# Patient Record
Sex: FEMALE | Race: WHITE | Employment: OTHER | URBAN - METROPOLITAN AREA
[De-identification: names, ages, dates, MRNs, and addresses within clinical notes are randomized per-mention and may not be internally consistent; named-entity substitution may affect disease eponyms.]

---

## 2017-11-30 ENCOUNTER — HOSPITAL ENCOUNTER (OUTPATIENT)
Dept: MAMMOGRAPHY | Age: 82
Discharge: HOME | End: 2017-11-30
Payer: MEDICARE

## 2017-11-30 DIAGNOSIS — Z12.39 SCREENING BREAST EXAMINATION: ICD-10-CM

## 2017-11-30 PROCEDURE — 77067 SCR MAMMO BI INCL CAD: CPT

## 2018-12-12 ENCOUNTER — HOSPITAL ENCOUNTER (OUTPATIENT)
Dept: MAMMOGRAPHY | Age: 83
Discharge: HOME | End: 2018-12-12
Attending: OBSTETRICS & GYNECOLOGY
Payer: MEDICARE

## 2018-12-12 DIAGNOSIS — N63.0 BREAST LUMP: ICD-10-CM

## 2018-12-12 DIAGNOSIS — N63.14 BREAST LUMP ON RIGHT SIDE AT 5 O'CLOCK POSITION: ICD-10-CM

## 2018-12-12 PROCEDURE — 77066 DX MAMMO INCL CAD BI: CPT

## 2018-12-12 PROCEDURE — 76642 ULTRASOUND BREAST LIMITED: CPT

## 2018-12-17 ENCOUNTER — HOSPITAL ENCOUNTER (OUTPATIENT)
Dept: MAMMOGRAPHY | Age: 83
Discharge: HOME | End: 2018-12-17
Attending: OBSTETRICS & GYNECOLOGY
Payer: MEDICARE

## 2018-12-17 VITALS — HEART RATE: 60 BPM | SYSTOLIC BLOOD PRESSURE: 163 MMHG | DIASTOLIC BLOOD PRESSURE: 71 MMHG

## 2018-12-17 DIAGNOSIS — R92.8 ABNORMAL MAMMOGRAM: ICD-10-CM

## 2018-12-17 DIAGNOSIS — N63.0 BREAST MASS: ICD-10-CM

## 2018-12-17 DIAGNOSIS — Z85.3 HISTORY OF BREAST CANCER: ICD-10-CM

## 2018-12-17 PROCEDURE — 88305 TISSUE EXAM BY PATHOLOGIST: CPT

## 2018-12-17 PROCEDURE — 74011250636 HC RX REV CODE- 250/636

## 2018-12-17 PROCEDURE — 77065 DX MAMMO INCL CAD UNI: CPT

## 2018-12-17 PROCEDURE — 88361 TUMOR IMMUNOHISTOCHEM/COMPUT: CPT

## 2018-12-17 PROCEDURE — 19083 BX BREAST 1ST LESION US IMAG: CPT

## 2018-12-17 RX ORDER — LIDOCAINE HYDROCHLORIDE 10 MG/ML
10 INJECTION INFILTRATION; PERINEURAL
Status: COMPLETED | OUTPATIENT
Start: 2018-12-17 | End: 2018-12-17

## 2018-12-17 RX ADMIN — LIDOCAINE HYDROCHLORIDE: 10 INJECTION, SOLUTION INFILTRATION; PERINEURAL at 10:05

## 2018-12-19 NOTE — PROGRESS NOTES
Dr Nils Sanders and I spoke with Ms Fabio Jones her  regarding the results of her right breast U/S bx. Pathology: IDC with Lobular features. She had no post bx issues or concerns other than a bit of itching under her breast line. She will meet with Chiquis 12/26 @ 2:00 and will discuss with him whether she should stay with her current Onc or come to us. She would like to wait until the end of Jan for surgery due to prior planned vacation.  I prayed with them and gave them an info packet

## 2019-01-10 ENCOUNTER — PATIENT OUTREACH (OUTPATIENT)
Dept: CASE MANAGEMENT | Age: 84
End: 2019-01-10

## 2019-01-10 NOTE — PROGRESS NOTES
1/10/2019 Saw the patient with Dr Alejandro Cedillo. She recently found a new breast mass which was worked up and is breast cancer. She had breast cancer in same breast in 2007. She took tamoxifen for five years and had radiation with previous breast cancer. She did take Aromasin in the past but did not tolerate the medication. She states she is not interested in Radiation or chemo at this time. She did have a sentinel node biopsy with the previous breast cancer. Dr Alejandro Cedillo feels the first step is surgery. Navigation information given to the patent . Will continue to follow.

## 2019-01-13 PROBLEM — C50.919 MALIGNANT NEOPLASM OF BREAST IN FEMALE, ESTROGEN RECEPTOR POSITIVE (HCC): Status: ACTIVE | Noted: 2019-01-13

## 2019-01-13 PROBLEM — Z17.0 MALIGNANT NEOPLASM OF BREAST IN FEMALE, ESTROGEN RECEPTOR POSITIVE (HCC): Status: ACTIVE | Noted: 2019-01-13

## 2019-01-24 ENCOUNTER — HOSPITAL ENCOUNTER (OUTPATIENT)
Dept: MRI IMAGING | Age: 84
Discharge: HOME | End: 2019-01-24
Attending: INTERNAL MEDICINE
Payer: MEDICARE

## 2019-01-24 DIAGNOSIS — C50.919 BREAST CANCER (HCC): ICD-10-CM

## 2019-01-24 PROCEDURE — A9575 INJ GADOTERATE MEGLUMI 0.1ML: HCPCS | Performed by: INTERNAL MEDICINE

## 2019-01-24 PROCEDURE — 74011250636 HC RX REV CODE- 250/636: Performed by: INTERNAL MEDICINE

## 2019-01-24 PROCEDURE — 74011000258 HC RX REV CODE- 258: Performed by: INTERNAL MEDICINE

## 2019-01-24 PROCEDURE — 77049 MRI BREAST C-+ W/CAD BI: CPT

## 2019-01-24 RX ORDER — SODIUM CHLORIDE 0.9 % (FLUSH) 0.9 %
10 SYRINGE (ML) INJECTION
Status: COMPLETED | OUTPATIENT
Start: 2019-01-24 | End: 2019-01-24

## 2019-01-24 RX ORDER — GADOTERATE MEGLUMINE 376.9 MG/ML
13 INJECTION INTRAVENOUS
Status: COMPLETED | OUTPATIENT
Start: 2019-01-24 | End: 2019-01-24

## 2019-01-24 RX ADMIN — SODIUM CHLORIDE 100 ML: 900 INJECTION, SOLUTION INTRAVENOUS at 11:53

## 2019-01-24 RX ADMIN — GADOTERATE MEGLUMINE 13 ML: 376.9 INJECTION INTRAVENOUS at 11:53

## 2019-01-24 RX ADMIN — Medication 10 ML: at 11:53

## 2019-02-05 ENCOUNTER — HOSPITAL ENCOUNTER (OUTPATIENT)
Dept: MAMMOGRAPHY | Age: 84
Discharge: HOME | End: 2019-02-05
Attending: SURGERY

## 2019-02-05 ENCOUNTER — HOSPITAL ENCOUNTER (OUTPATIENT)
Dept: MAMMOGRAPHY | Age: 84
Discharge: HOME | End: 2019-02-05
Attending: SURGERY
Payer: MEDICARE

## 2019-02-05 DIAGNOSIS — R93.89 ABNORMAL MRI: ICD-10-CM

## 2019-02-05 PROCEDURE — 77065 DX MAMMO INCL CAD UNI: CPT

## 2019-02-14 ENCOUNTER — HOSPITAL ENCOUNTER (OUTPATIENT)
Dept: MRI IMAGING | Age: 84
Discharge: HOME | End: 2019-02-14
Attending: SURGERY
Payer: MEDICARE

## 2019-02-14 ENCOUNTER — HOSPITAL ENCOUNTER (OUTPATIENT)
Dept: MAMMOGRAPHY | Age: 84
Discharge: HOME | End: 2019-02-14
Attending: SURGERY
Payer: MEDICARE

## 2019-02-14 DIAGNOSIS — R92.8 ABNORMAL MAMMOGRAM OF RIGHT BREAST: ICD-10-CM

## 2019-02-14 PROCEDURE — 88305 TISSUE EXAM BY PATHOLOGIST: CPT

## 2019-02-14 PROCEDURE — A9575 INJ GADOTERATE MEGLUMI 0.1ML: HCPCS | Performed by: SURGERY

## 2019-02-14 PROCEDURE — 77065 DX MAMMO INCL CAD UNI: CPT

## 2019-02-14 PROCEDURE — 74011000258 HC RX REV CODE- 258: Performed by: SURGERY

## 2019-02-14 PROCEDURE — 19085 BX BREAST 1ST LESION MR IMAG: CPT

## 2019-02-14 PROCEDURE — 74011250636 HC RX REV CODE- 250/636: Performed by: SURGERY

## 2019-02-14 RX ORDER — SODIUM CHLORIDE 0.9 % (FLUSH) 0.9 %
10 SYRINGE (ML) INJECTION
Status: COMPLETED | OUTPATIENT
Start: 2019-02-14 | End: 2019-02-14

## 2019-02-14 RX ORDER — GADOTERATE MEGLUMINE 376.9 MG/ML
20 INJECTION INTRAVENOUS
Status: COMPLETED | OUTPATIENT
Start: 2019-02-14 | End: 2019-02-14

## 2019-02-14 RX ADMIN — GADOTERATE MEGLUMINE 20 ML: 376.9 INJECTION INTRAVENOUS at 11:17

## 2019-02-14 RX ADMIN — SODIUM CHLORIDE 100 ML: 900 INJECTION, SOLUTION INTRAVENOUS at 11:18

## 2019-02-14 RX ADMIN — Medication 10 ML: at 11:18

## 2019-02-14 RX ADMIN — Medication 10 ML: at 12:16

## 2019-02-19 ENCOUNTER — HOSPITAL ENCOUNTER (OUTPATIENT)
Dept: SURGERY | Age: 84
Discharge: HOME | End: 2019-02-19

## 2019-02-19 NOTE — H&P (VIEW-ONLY)
Clifford Durham MD  
[facility] [place] [address], [address] Yudy Patel Phone [phone]   Fax [phone] Date of visit: 2019 Primary/Requesting provider: Eduarda Burkett MD  
 
Name: Owen Young     
MRN: [id number] : 1935 Age: 82 y.o. Sex: female CC:   
Chief Complaint Patient presents with  Follow-up  
  breast cancer right HPI: 
 
 Owen Young is a 82 y.o. female who presents here for follow up of her right breast cancer. Back in November, she was showering when she noticed a right breast lump. She was referred to have a mammogram which revealed a suspicious lesion. The lesion was biopsied and it revealed cancer. She denies breast pain. No other palpable masses in her breasts. She denies drainage from her nipples. No aggravating factors.  
  
She had history of right breast cancer about 11 years ago in the same vicinity. She underwent a lumpectomy and radiation. She also needed a second resection to get good margins. She also received mammocyte. She received tamoxifen for 5 years. Previous treatment was at [facility]. 
 
Most recently, she was seen by Dr. Froy Roth who ordered a breast MRI and this revealed a new concerning lesion. This was biopsied and revealed intraductal hyperplasia.  
  
She came today because she wanted to discuss surgery again and go over some questions regarding recovery and perioperative care.  
  
Risk Factors Family History of Breast Cancer Yes; sister and maternal aunt Menarche <13 yo Yes History of Radiation <31 yo  No  
 Nulliparous No  
 Personal History of Breast cancer Yes Menopause >58 yo Yes History of smoking No  
  
 
 
 
PMH: 
 
Past Medical History:  
Diagnosis Date  [facility]  HX: breast cancer 2007 RT Lumpectomy  Radiation therapy complication 2995 PSH: 
 
 Past Surgical History:  
Procedure Laterality Date  HX BREAST BIOPSY Right 09/2007 RT Lumpectomy  HX BREAST LUMPECTOMY    
 9/5/2007/ right  HX HYSTERECTOMY  HX KNEE REPLACEMENT Left 2018  HX KNEE REPLACEMENT Right MEDS: 
 
Current Outpatient Medications Medication Sig  
 losartan (COZAAR) 50 mg tablet Take  by mouth daily.  OXYBUTYNIN CHLORIDE PO Take  by mouth three (3) times daily. 1/2 pill three times a day  aspirin delayed-release 81 mg tablet Take  by mouth daily.  multivitamin (ONE A DAY) tablet Take 1 Tab by mouth daily.  cholecalciferol (VITAMIN D3) 1,000 unit cap Take  by mouth daily.  magnesium oxide (MAG-OX) 400 mg tablet Take 400 mg by mouth daily.  glucosam-chondro-herb 149-hyal (GLUCOS CHOND CPLX ADVANCED PO) Take  by mouth.  polyethylene glycol (MIRALAX) 17 gram/dose powder Take 17 g by mouth daily.  B.infantis-B.ani-B.long-B.bifi (PROBIOTIC 4X) 10-15 mg TbEC Take  by mouth.  ascorbic acid, vitamin C, (VITAMIN C) 500 mg tablet Take  by mouth.  TURMERIC PO Take 538 mg by mouth daily.  cyanocobalamin (VITAMIN B-12) 100 mcg tablet Take 100 mcg by mouth daily.  escitalopram oxalate (LEXAPRO) 5 mg tablet  meloxicam (MOBIC) 15 mg tablet  metoprolol succinate (TOPROL-XL) 25 mg XL tablet No current facility-administered medications for this visit. ALLERGIES:   
 
No Known Allergies SH: Social History Tobacco Use  Smoking status: Never Smoker  Smokeless tobacco: Never Used Substance Use Topics  Alcohol use: Yes Alcohol/week: 4.2 oz Types: 7 Glasses of wine per week  Drug use: Not on file FH: 
 
Family History Problem Relation Age of Onset  Breast Cancer Maternal Aunt 28  
 Heart Disease Mother  Stroke Mother  Heart Attack Father  Cancer Sister Review of systems: 
Review of Systems Constitutional: Negative for chills, fever and weight loss. HENT: Negative. Eyes: Negative. Respiratory: Negative. Negative for cough, hemoptysis, sputum production and shortness of breath. Cardiovascular: Negative for chest pain, palpitations, orthopnea, claudication, leg swelling and PND. Htn,elevated cholesterol Gastrointestinal: Negative for abdominal pain, blood in stool, constipation, diarrhea, heartburn, nausea and vomiting. Genitourinary:  
     Frequent UTI Musculoskeletal:  
     Sciatic pain Skin: Negative. Neurological: Negative for seizures, loss of consciousness and headaches. Psychiatric/Behavioral: Positive for depression. Negative for hallucinations, substance abuse and suicidal ideas. The patient is not nervous/anxious. Physical Exam:  
General:  Well-developed, well-nourished, no distress. Psych:  Cooperative, good insight and judgement. Neuro:  Alert, oriented to person, place and time. HEENT:  Normocephalic, atraumatic. Sclera clear. Lungs:  Unlabored breathing. Symmetrical chest expansion. Breast: no masses on left breast. Right breast with 1 cm hard nodule about 6'o clock position palpable under the skin. Axilla: No palpable axillary lymphadenopathy Chest wall:  No tenderness or deformity. Heart:  Regular rate and rhythm. No JVD. Abdomen:  Soft, non-tender, non-distended. No palpable masses. Extremities:  Extremities normal, atraumatic, no cyanosis or edema. Skin:  Skin color, texture, turgor normal. No rashes. Imaging: 
 [facility] Post Bx Imaging Rt Incl Cad Result Date: 2/19/2019 IMPRESSION: 1.  SUCCESSFUL CORE NEEDLE BIOPSY AND MARKER CLIP PLACEMENT FOR LINEAR ENHANCEMENT EXTENDING OVER APPROXIMATELY 3 CM AT THE POSTERIOR 7:00 POSITION. 2.  THE MARKER CLIP IS SIGNIFICANTLY DISPLACED FROM THE BIOPSY BED, WHICH SHOULD BE ACCOUNTED FOR IN ANY NECESSARY FUTURE LOCALIZATION.  3.  IF HISTOLOGY FAILS TO CONFIRM MALIGNANCY, THEN FOLLOWUP BREAST MRI IS RECOMMENDED IN 1 YEAR PER THE PERCUTANEOUS CORE NEEDLE BIOPSY PROTOCOL. Thank you for referral of this patient. Mri Bx Breast Vac Rt 1st Lesion W/clip And Specimen Result Date: 2/19/2019 IMPRESSION: 1.  SUCCESSFUL CORE NEEDLE BIOPSY AND MARKER CLIP PLACEMENT FOR LINEAR ENHANCEMENT EXTENDING OVER APPROXIMATELY 3 CM AT THE POSTERIOR 7:00 POSITION. 2.  THE MARKER CLIP IS SIGNIFICANTLY DISPLACED FROM THE BIOPSY BED, WHICH SHOULD BE ACCOUNTED FOR IN ANY NECESSARY FUTURE LOCALIZATION. 3.  IF HISTOLOGY FAILS TO CONFIRM MALIGNANCY, THEN FOLLOWUP BREAST MRI IS RECOMMENDED IN 1 YEAR PER THE PERCUTANEOUS CORE NEEDLE BIOPSY PROTOCOL. Thank you for referral of this patient. DIAGNOSIS  
A: \"RIGHT BREAST, 5:00 POSITION, 6 CM FROM NIPPLE, CORE BIOPSY\":  
INFILTRATING DUCT CARCINOMA WITH LOBULAR FEATURES, LOW GRADE (WELL DIFFERENTIATED). DEFINITE IN SITU COMPONENT AND LYMPHOVASCULAR INVASION ARE NOT IDENTIFIED  
jtl/12/18/2018 Electronically signed out on 12/18/2018 05:57 by YOANNA Suazo M.D. DIAGNOSIS  
A: \"RIGHT BREAST, 7:00 POSITION, 3 CM FROM NIPPLE, CORE BIOPSY\":  
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  
jtl/2/15/2019 Electronically signed out on 2/15/2019 06:50 by YOANNA Suazo M.D. ICD-10-CM ICD-9-CM 1. Malignant neoplasm of overlapping sites of right breast in female, estrogen receptor positive ([facility] C50.811 174.8   
 Z17.0 V86.0 2. Breast mass, right N63.10 611.72   
3. Intraductal hyperplasia without atypia of right breast N60.91 610.8 Assessment/Plan:  Debbie Gabriel is a 82 y.o. female who has right breast cancer 1. Breast MRI images were independently reviewed by me. I agree with radiologist: she has another area of concern in addition to the palpable mass. This was biopsied. 2. Biopsy Path reviewed: She has infiltrating ductal carcinoma and an area of intraductal hyperplasia. ER Positive; NJ Positive; Her-2 Negative 3. I discussed in detail with there the findings of intraductal hyperplasia on her second right breast biopsy. No need for excision of this new area. 4.  I have answered all her questions regarding duration of surgery, wound care post surgery, location of incision, perioperative care and needs, follow up and post surgery activity. 5. We will proceed with right breast partial mastectomy for the right palpable breast cancer. I spent greater that 50% of this 25 min on counseling about surgery. Signed:  Chacha Long MD 
[address] [facility] 2/19/2019 10:40 AM

## 2019-02-20 VITALS — HEIGHT: 63 IN | WEIGHT: 143 LBS | BODY MASS INDEX: 25.34 KG/M2

## 2019-02-20 NOTE — PERIOP NOTES
Patient verified name and . Order for consent found in EHR and matches case posting; patient verifies procedure. Type 1B surgery, phone assessment complete. Orders received. Labs per surgeon: none  Labs per anesthesia protocol: none    Patient answered medical/surgical history questions at their best of ability. All prior to admission medications documented in [facility]. Patient instructed to take the following medications the day of surgery according to anesthesia guidelines with a small sip of water: lexapro, metoprolol, oxybutynin. Hold all vitamins 7 days prior to surgery and NSAIDS 5 days prior to surgery. Prescription meds to hold: mobic x 5 days, losartan DOS. Patient instructed on the following:  Arrive at A Entrance, time of arrival to be called the day before by 1700  NPO after midnight including gum, mints, and ice chips  Responsible adult must drive patient to the hospital, stay during surgery, and patient will need supervision 24 hours after anesthesia  Use antibacterial soap in shower the night before surgery and on the morning of surgery  All piercings must be removed prior to arrival.    Leave all valuables (money and jewelry) at home but bring insurance card and ID on DOS. Do not wear make-up, nail polish, lotions, cologne, perfumes, powders, or oil on skin. Patient teach back successful and patient demonstrates knowledge of instruction.

## 2019-02-24 ENCOUNTER — ANESTHESIA EVENT (OUTPATIENT)
Dept: SURGERY | Age: 84
End: 2019-02-24
Payer: MEDICARE

## 2019-02-25 ENCOUNTER — ANESTHESIA (OUTPATIENT)
Dept: SURGERY | Age: 84
End: 2019-02-25
Payer: MEDICARE

## 2019-02-25 ENCOUNTER — HOSPITAL ENCOUNTER (OUTPATIENT)
Age: 84
Setting detail: OUTPATIENT SURGERY
Discharge: HOME | End: 2019-02-25
Attending: SURGERY | Admitting: SURGERY
Payer: MEDICARE

## 2019-02-25 VITALS
WEIGHT: 141.4 LBS | TEMPERATURE: 97.4 F | HEART RATE: 79 BPM | OXYGEN SATURATION: 92 % | RESPIRATION RATE: 16 BRPM | BODY MASS INDEX: 24.85 KG/M2 | SYSTOLIC BLOOD PRESSURE: 131 MMHG | DIASTOLIC BLOOD PRESSURE: 66 MMHG

## 2019-02-25 DIAGNOSIS — Z17.0 MALIGNANT NEOPLASM OF RIGHT BREAST IN FEMALE, ESTROGEN RECEPTOR POSITIVE, UNSPECIFIED SITE OF BREAST (HCC): Primary | ICD-10-CM

## 2019-02-25 DIAGNOSIS — C50.911 MALIGNANT NEOPLASM OF RIGHT BREAST IN FEMALE, ESTROGEN RECEPTOR POSITIVE, UNSPECIFIED SITE OF BREAST (HCC): Primary | ICD-10-CM

## 2019-02-25 PROCEDURE — 77030002996 HC SUT SLK J&J -A: Performed by: SURGERY

## 2019-02-25 PROCEDURE — 76010000138 HC OR TIME 0.5 TO 1 HR: Performed by: SURGERY

## 2019-02-25 PROCEDURE — 77030020782 HC GWN BAIR PAWS FLX 3M -B: Performed by: ANESTHESIOLOGY

## 2019-02-25 PROCEDURE — 74011250636 HC RX REV CODE- 250/636

## 2019-02-25 PROCEDURE — 77030010509 HC AIRWY LMA MSK TELE -A: Performed by: ANESTHESIOLOGY

## 2019-02-25 PROCEDURE — 88307 TISSUE EXAM BY PATHOLOGIST: CPT

## 2019-02-25 PROCEDURE — 76060000032 HC ANESTHESIA 0.5 TO 1 HR: Performed by: SURGERY

## 2019-02-25 PROCEDURE — 77030002933 HC SUT MCRYL J&J -A: Performed by: SURGERY

## 2019-02-25 PROCEDURE — 77030031139 HC SUT VCRL2 J&J -A: Performed by: SURGERY

## 2019-02-25 PROCEDURE — 77030032490 HC SLV COMPR SCD KNE COVD -B: Performed by: SURGERY

## 2019-02-25 PROCEDURE — 74011000250 HC RX REV CODE- 250

## 2019-02-25 PROCEDURE — 74011000250 HC RX REV CODE- 250: Performed by: SURGERY

## 2019-02-25 PROCEDURE — 19301 PARTIAL MASTECTOMY: CPT | Performed by: SURGERY

## 2019-02-25 PROCEDURE — 76210000020 HC REC RM PH II FIRST 0.5 HR: Performed by: SURGERY

## 2019-02-25 PROCEDURE — 76210000016 HC OR PH I REC 1 TO 1.5 HR: Performed by: SURGERY

## 2019-02-25 PROCEDURE — 74011250636 HC RX REV CODE- 250/636: Performed by: SURGERY

## 2019-02-25 PROCEDURE — 77030020399: Performed by: SURGERY

## 2019-02-25 PROCEDURE — 74011250636 HC RX REV CODE- 250/636: Performed by: ANESTHESIOLOGY

## 2019-02-25 RX ORDER — MIDAZOLAM HYDROCHLORIDE 1 MG/ML
2 INJECTION, SOLUTION INTRAMUSCULAR; INTRAVENOUS
Status: DISCONTINUED | OUTPATIENT
Start: 2019-02-25 | End: 2019-02-25 | Stop reason: HOSPADM

## 2019-02-25 RX ORDER — NALOXONE HYDROCHLORIDE 0.4 MG/ML
0.04 INJECTION, SOLUTION INTRAMUSCULAR; INTRAVENOUS; SUBCUTANEOUS
Status: DISCONTINUED | OUTPATIENT
Start: 2019-02-25 | End: 2019-02-25 | Stop reason: HOSPADM

## 2019-02-25 RX ORDER — LIDOCAINE HYDROCHLORIDE 20 MG/ML
INJECTION, SOLUTION EPIDURAL; INFILTRATION; INTRACAUDAL; PERINEURAL AS NEEDED
Status: DISCONTINUED | OUTPATIENT
Start: 2019-02-25 | End: 2019-02-25 | Stop reason: HOSPADM

## 2019-02-25 RX ORDER — CEFAZOLIN SODIUM/WATER 2 G/20 ML
2 SYRINGE (ML) INTRAVENOUS ONCE
Status: COMPLETED | OUTPATIENT
Start: 2019-02-25 | End: 2019-02-25

## 2019-02-25 RX ORDER — FENTANYL CITRATE 50 UG/ML
INJECTION, SOLUTION INTRAMUSCULAR; INTRAVENOUS AS NEEDED
Status: DISCONTINUED | OUTPATIENT
Start: 2019-02-25 | End: 2019-02-25 | Stop reason: HOSPADM

## 2019-02-25 RX ORDER — SODIUM CHLORIDE 9 MG/ML
25 INJECTION, SOLUTION INTRAVENOUS AS NEEDED
Status: DISCONTINUED | OUTPATIENT
Start: 2019-02-25 | End: 2019-02-25 | Stop reason: HOSPADM

## 2019-02-25 RX ORDER — SODIUM CHLORIDE, SODIUM LACTATE, POTASSIUM CHLORIDE, CALCIUM CHLORIDE 600; 310; 30; 20 MG/100ML; MG/100ML; MG/100ML; MG/100ML
100 INJECTION, SOLUTION INTRAVENOUS CONTINUOUS
Status: DISCONTINUED | OUTPATIENT
Start: 2019-02-25 | End: 2019-02-25 | Stop reason: HOSPADM

## 2019-02-25 RX ORDER — MIDAZOLAM HYDROCHLORIDE 1 MG/ML
2 INJECTION, SOLUTION INTRAMUSCULAR; INTRAVENOUS ONCE
Status: DISCONTINUED | OUTPATIENT
Start: 2019-02-25 | End: 2019-02-25 | Stop reason: HOSPADM

## 2019-02-25 RX ORDER — PROPOFOL 10 MG/ML
INJECTION, EMULSION INTRAVENOUS AS NEEDED
Status: DISCONTINUED | OUTPATIENT
Start: 2019-02-25 | End: 2019-02-25 | Stop reason: HOSPADM

## 2019-02-25 RX ORDER — DEXAMETHASONE SODIUM PHOSPHATE 4 MG/ML
INJECTION, SOLUTION INTRA-ARTICULAR; INTRALESIONAL; INTRAMUSCULAR; INTRAVENOUS; SOFT TISSUE AS NEEDED
Status: DISCONTINUED | OUTPATIENT
Start: 2019-02-25 | End: 2019-02-25 | Stop reason: HOSPADM

## 2019-02-25 RX ORDER — SODIUM CHLORIDE 0.9 % (FLUSH) 0.9 %
5-40 SYRINGE (ML) INJECTION AS NEEDED
Status: DISCONTINUED | OUTPATIENT
Start: 2019-02-25 | End: 2019-02-25 | Stop reason: HOSPADM

## 2019-02-25 RX ORDER — SODIUM CHLORIDE 0.9 % (FLUSH) 0.9 %
5-40 SYRINGE (ML) INJECTION EVERY 8 HOURS
Status: DISCONTINUED | OUTPATIENT
Start: 2019-02-25 | End: 2019-02-25 | Stop reason: HOSPADM

## 2019-02-25 RX ORDER — OXYCODONE HYDROCHLORIDE 5 MG/1
5 TABLET ORAL
Status: DISCONTINUED | OUTPATIENT
Start: 2019-02-25 | End: 2019-02-25 | Stop reason: HOSPADM

## 2019-02-25 RX ORDER — EPHEDRINE SULFATE 50 MG/ML
INJECTION, SOLUTION INTRAVENOUS AS NEEDED
Status: DISCONTINUED | OUTPATIENT
Start: 2019-02-25 | End: 2019-02-25 | Stop reason: HOSPADM

## 2019-02-25 RX ORDER — BUPIVACAINE HYDROCHLORIDE 5 MG/ML
INJECTION, SOLUTION EPIDURAL; INTRACAUDAL AS NEEDED
Status: DISCONTINUED | OUTPATIENT
Start: 2019-02-25 | End: 2019-02-25 | Stop reason: HOSPADM

## 2019-02-25 RX ORDER — HYDROMORPHONE HYDROCHLORIDE 2 MG/ML
0.5 INJECTION, SOLUTION INTRAMUSCULAR; INTRAVENOUS; SUBCUTANEOUS
Status: DISCONTINUED | OUTPATIENT
Start: 2019-02-25 | End: 2019-02-25 | Stop reason: HOSPADM

## 2019-02-25 RX ORDER — FENTANYL CITRATE 50 UG/ML
100 INJECTION, SOLUTION INTRAMUSCULAR; INTRAVENOUS ONCE
Status: DISCONTINUED | OUTPATIENT
Start: 2019-02-25 | End: 2019-02-25 | Stop reason: HOSPADM

## 2019-02-25 RX ORDER — LIDOCAINE HYDROCHLORIDE 10 MG/ML
0.1 INJECTION INFILTRATION; PERINEURAL AS NEEDED
Status: DISCONTINUED | OUTPATIENT
Start: 2019-02-25 | End: 2019-02-25 | Stop reason: HOSPADM

## 2019-02-25 RX ORDER — OXYCODONE AND ACETAMINOPHEN 5; 325 MG/1; MG/1
1 TABLET ORAL
Qty: 20 TAB | Refills: 0 | Status: SHIPPED | OUTPATIENT
Start: 2019-02-25 | End: 2019-03-21

## 2019-02-25 RX ADMIN — DEXAMETHASONE SODIUM PHOSPHATE 10 MG: 4 INJECTION, SOLUTION INTRA-ARTICULAR; INTRALESIONAL; INTRAMUSCULAR; INTRAVENOUS; SOFT TISSUE at 12:24

## 2019-02-25 RX ADMIN — FENTANYL CITRATE 25 MCG: 50 INJECTION, SOLUTION INTRAMUSCULAR; INTRAVENOUS at 12:20

## 2019-02-25 RX ADMIN — Medication 2 G: at 12:10

## 2019-02-25 RX ADMIN — LIDOCAINE HYDROCHLORIDE 60 MG: 20 INJECTION, SOLUTION EPIDURAL; INFILTRATION; INTRACAUDAL; PERINEURAL at 12:15

## 2019-02-25 RX ADMIN — EPHEDRINE SULFATE 5 MG: 50 INJECTION, SOLUTION INTRAVENOUS at 12:41

## 2019-02-25 RX ADMIN — PROPOFOL 200 MG: 10 INJECTION, EMULSION INTRAVENOUS at 12:15

## 2019-02-25 RX ADMIN — FENTANYL CITRATE 50 MCG: 50 INJECTION, SOLUTION INTRAMUSCULAR; INTRAVENOUS at 12:15

## 2019-02-25 RX ADMIN — SODIUM CHLORIDE, SODIUM LACTATE, POTASSIUM CHLORIDE, AND CALCIUM CHLORIDE 100 ML/HR: 600; 310; 30; 20 INJECTION, SOLUTION INTRAVENOUS at 10:40

## 2019-02-25 RX ADMIN — DEXAMETHASONE SODIUM PHOSPHATE 4 MG: 4 INJECTION, SOLUTION INTRA-ARTICULAR; INTRALESIONAL; INTRAMUSCULAR; INTRAVENOUS; SOFT TISSUE at 12:26

## 2019-02-25 RX ADMIN — EPHEDRINE SULFATE 10 MG: 50 INJECTION, SOLUTION INTRAVENOUS at 12:24

## 2019-02-25 RX ADMIN — HYDROMORPHONE HYDROCHLORIDE 0.5 MG: 2 INJECTION, SOLUTION INTRAMUSCULAR; INTRAVENOUS; SUBCUTANEOUS at 13:07

## 2019-02-25 RX ADMIN — EPHEDRINE SULFATE 10 MG: 50 INJECTION, SOLUTION INTRAVENOUS at 12:23

## 2019-02-25 RX ADMIN — FENTANYL CITRATE 25 MCG: 50 INJECTION, SOLUTION INTRAMUSCULAR; INTRAVENOUS at 12:28

## 2019-02-25 NOTE — OP NOTES
Operative Report    Name: lAfredo Lockwood      MRN: [id number]       : 1935       Age: 82 y.o. Sex: female    Date of Surgery: 2019     Preoperative Diagnosis: Malignant neoplasm of lower-inner quadrant of right female breast, unspecified estrogen receptor status [facility] [C50.311]     Postoperative Diagnosis: Malignant neoplasm of lower-inner quadrant of right female breast, unspecified estrogen receptor status ([facility] [facility] [place] [address]     Name of procedure:  Procedure(s):  RIGHT PARTIAL BREAST MASTECTOMY. Surgeon:  Surgeon(s) and Role:     * Darlene Mckoy MD - Primary    Anesthesia: General     Complications: None    Estimated Blood Loss: Minimal           Specimens:   ID Type Source Tests Collected by Time Destination   1 : right breast partial mastectomy Fresh Breast  Darlene Mckoy MD 2019 1235 Pathology       Statement of Medical Necessity: Alfredo Lockwood is a 82 y.o. female who presented to the clinic complaining of right breast mass. This was found to be infiltrating ductal carcinoma on pathology. She desire excision. Due to her age, she refused a sentinel lymph node biopsy and data also does not support it. A right partial mastectomy was recommended. We discussed risks, benefits and alternatives of surgery. Patient understood and agreed to proceed. Procedure Details   After informed consent was taken, patient was taken to the operating room and placed in supine position. Anesthesia was induced and patient was intubated. Patient received preoperative antibiotics. Right breast was prepped and draped in standard sterile fashion. A timeout was performed with all team members present. The mass was palpated under the skin approximately at [facility] clock in the right breast. A 3 x 2 cm incision was made in the around the mass to allow for 1 cm margins circumferentially.  This was carried down through the subcutaneous tissues with electrocautery.  The posterior margin was the pectoralis fascia. A generous lumpectomy was performed. The specimen was marked for orientation with a short suture at the superior margin and a long suture at the lateral margin. It was sent to pathology for review. The lumpectomy site was inspected. There was no evidence of gross or palpable disease remaining. The cavity was irrigated and suctioned. Hemostasis was achieved.  The area was infiltrated with local anesthetic. The deep dermis was closed with interrupted 3-0 Vicryl. The skin was closed with 4-0 Monocryl subcuticular sutures. Mastisol and Steri-Strips were applied to the wound. A sterile dressing was placed consisting of dry 2x2 gauze and Tegaderm.  Sponge and needle counts were correct ath the end of the procedure.  The patient tolerated the procedure well.  The patient was transferred to recovery room in stable condition.        Signed by: Chiki Ledesma MD  [id number] [facility]  2/25/2019 12:50 PM

## 2019-02-25 NOTE — ANESTHESIA POSTPROCEDURE EVALUATION
Procedure(s): RIGHT PARTIAL BREAST MASTECTOMY. Anesthesia Post Evaluation Multimodal analgesia: multimodal analgesia used between 6 hours prior to anesthesia start to PACU discharge Patient location during evaluation: PACU Patient participation: complete - patient participated Level of consciousness: awake Pain management: adequate Airway patency: patent Anesthetic complications: no 
Cardiovascular status: acceptable and hemodynamically stable Respiratory status: acceptable Hydration status: acceptable Comments: Acceptable for discharge from PACU. Visit Vitals /66 Pulse 79 Temp 36.3 °C (97.4 °F) Resp 16 Wt 64.1 kg (141 lb 6.4 oz) SpO2 92% BMI 24.85 kg/m²

## 2019-02-25 NOTE — DISCHARGE INSTRUCTIONS
No bathtub or pool for 2 weeks. Wear a sport bra for support for the next 3-4 weeks. Remove the Plastic dressing and gauze in 2 days. Leave the Steri-strips intact, they will fall off on their own. Take tylenol or ibuprofen for as needed for mild pain every 4-6 hours. Percocet prescribed for mod to severe pain. This is a narcotic and can cause drowsiness, nausea and vomiting and constipation. Take Colace 100mg BID (over the counter) if constipated.

## 2019-02-25 NOTE — PROGRESS NOTES
's Pre-op visit requested by patient. Conveyed care and concern for patient and family. No concerns were voiced during the visit. Ms. Radha Christianson son-in-law is a  and he was present during the visit. Chaplains remain available for support as needed. Claire Travis MDiv, BS Board Certified [employer]

## 2019-02-25 NOTE — INTERVAL H&P NOTE
H&P Update: 
Andra Essex was seen and examined. History and physical has been reviewed. The patient has been examined.  There have been no significant clinical changes since the completion of the originally dated History and Physical. 
 
Signed By: Melany Singleton MD   
 February 25, 2019 11:34 AM

## 2019-02-25 NOTE — ANESTHESIA PREPROCEDURE EVALUATION
Anesthetic History No history of anesthetic complications Review of Systems / Medical History Pertinent labs reviewed Pulmonary Within defined limits Neuro/Psych Psychiatric history Cardiovascular Hypertension Exercise tolerance: >4 METS 
  
GI/Hepatic/Renal 
Within defined limits Endo/Other Arthritis and cancer (breast) Other Findings Physical Exam 
 
Airway Mallampati: II 
TM Distance: 4 - 6 cm Neck ROM: normal range of motion Mouth opening: Normal 
 
 Cardiovascular Regular rate and rhythm,  S1 and S2 normal,  no murmur, click, rub, or gallop Dental 
 
Dentition: Implants Pulmonary Breath sounds clear to auscultation Abdominal 
GI exam deferred Other Findings Anesthetic Plan ASA: 2 Anesthesia type: general 
 
 
 
 
Induction: Intravenous Anesthetic plan and risks discussed with: Patient and Family

## 2019-02-25 NOTE — BRIEF OP NOTE
BRIEF OPERATIVE NOTE Date of Procedure: 2/25/2019 Preoperative Diagnosis: Malignant neoplasm of lower-inner quadrant of right female breast, unspecified estrogen receptor status ([facility] Cristinebakarianatoliy Arun Postoperative Diagnosis: Malignant neoplasm of lower-inner quadrant of right female breast, unspecified estrogen receptor status ([facility] Cristinefroylan Dias Procedure(s): RIGHT PARTIAL BREAST MASTECTOMY Surgeon(s) and Role: Nena Ramirez MD - Primary Surgical Assistant: None Surgical Staff: 
Circ-1: Suzanne Banks RN 
Circ-2: Zenia Live RN Scrub Tech-1: Maine Perez Scrub Tech-2: Ashkan Zabala Scrub Tech-3: Leon Guajardo Event Time In Time Out Incision Start 0484 31 29 02 Incision Close 1247 Anesthesia: General  
Estimated Blood Loss: Minimal 
Specimens:  
ID Type Source Tests Collected by Time Destination 1 : right breast partial mastectomy Fresh Breast  Nena Ramirez MD 2/25/2019 1235 Pathology Findings: Solid mass underneath skin approx a 6'o clock in right breast removed. Long suture lateral. Short suture superior. Complications: None Implants: * No implants in log *

## 2019-03-01 ENCOUNTER — HOSPITAL ENCOUNTER (OUTPATIENT)
Dept: SURGERY | Age: 84
Discharge: HOME | End: 2019-03-01

## 2019-03-01 VITALS — WEIGHT: 143 LBS | HEIGHT: 63 IN | BODY MASS INDEX: 25.34 KG/M2

## 2019-03-03 ENCOUNTER — ANESTHESIA EVENT (OUTPATIENT)
Dept: SURGERY | Age: 84
End: 2019-03-03
Payer: MEDICARE

## 2019-03-03 RX ORDER — MIDAZOLAM HYDROCHLORIDE 1 MG/ML
2 INJECTION, SOLUTION INTRAMUSCULAR; INTRAVENOUS
Status: CANCELLED | OUTPATIENT
Start: 2019-03-03 | End: 2019-03-04

## 2019-03-04 ENCOUNTER — ANESTHESIA (OUTPATIENT)
Dept: SURGERY | Age: 84
End: 2019-03-04
Payer: MEDICARE

## 2019-03-04 ENCOUNTER — HOSPITAL ENCOUNTER (OUTPATIENT)
Age: 84
Setting detail: OUTPATIENT SURGERY
Discharge: HOME | End: 2019-03-04
Attending: SURGERY | Admitting: SURGERY
Payer: MEDICARE

## 2019-03-04 VITALS
BODY MASS INDEX: 25.34 KG/M2 | RESPIRATION RATE: 14 BRPM | DIASTOLIC BLOOD PRESSURE: 78 MMHG | SYSTOLIC BLOOD PRESSURE: 145 MMHG | HEART RATE: 65 BPM | HEIGHT: 63 IN | WEIGHT: 143 LBS | TEMPERATURE: 97.4 F | OXYGEN SATURATION: 97 %

## 2019-03-04 DIAGNOSIS — C50.911 MALIGNANT NEOPLASM OF RIGHT BREAST IN FEMALE, ESTROGEN RECEPTOR POSITIVE, UNSPECIFIED SITE OF BREAST (HCC): ICD-10-CM

## 2019-03-04 DIAGNOSIS — Z17.0 MALIGNANT NEOPLASM OF RIGHT BREAST IN FEMALE, ESTROGEN RECEPTOR POSITIVE, UNSPECIFIED SITE OF BREAST (HCC): ICD-10-CM

## 2019-03-04 PROCEDURE — 74011250636 HC RX REV CODE- 250/636: Performed by: SURGERY

## 2019-03-04 PROCEDURE — 77030010509 HC AIRWY LMA MSK TELE -A: Performed by: ANESTHESIOLOGY

## 2019-03-04 PROCEDURE — 74011250636 HC RX REV CODE- 250/636: Performed by: ANESTHESIOLOGY

## 2019-03-04 PROCEDURE — 76210000006 HC OR PH I REC 0.5 TO 1 HR: Performed by: SURGERY

## 2019-03-04 PROCEDURE — 74011000250 HC RX REV CODE- 250

## 2019-03-04 PROCEDURE — 77030002933 HC SUT MCRYL J&J -A: Performed by: SURGERY

## 2019-03-04 PROCEDURE — 77030020782 HC GWN BAIR PAWS FLX 3M -B: Performed by: ANESTHESIOLOGY

## 2019-03-04 PROCEDURE — 77030031139 HC SUT VCRL2 J&J -A: Performed by: SURGERY

## 2019-03-04 PROCEDURE — 76010000160 HC OR TIME 0.5 TO 1 HR INTENSV-TIER 1: Performed by: SURGERY

## 2019-03-04 PROCEDURE — 74011250636 HC RX REV CODE- 250/636

## 2019-03-04 PROCEDURE — 76060000032 HC ANESTHESIA 0.5 TO 1 HR: Performed by: SURGERY

## 2019-03-04 PROCEDURE — 77030002996 HC SUT SLK J&J -A: Performed by: SURGERY

## 2019-03-04 PROCEDURE — 77030010514 HC APPL CLP LIG COVD -B: Performed by: SURGERY

## 2019-03-04 PROCEDURE — 76210000020 HC REC RM PH II FIRST 0.5 HR: Performed by: SURGERY

## 2019-03-04 PROCEDURE — 19301 PARTIAL MASTECTOMY: CPT | Performed by: SURGERY

## 2019-03-04 PROCEDURE — 77030018836 HC SOL IRR NACL ICUM -A: Performed by: SURGERY

## 2019-03-04 PROCEDURE — 88305 TISSUE EXAM BY PATHOLOGIST: CPT

## 2019-03-04 PROCEDURE — 74011000250 HC RX REV CODE- 250: Performed by: SURGERY

## 2019-03-04 RX ORDER — EPHEDRINE SULFATE 50 MG/ML
INJECTION, SOLUTION INTRAVENOUS AS NEEDED
Status: DISCONTINUED | OUTPATIENT
Start: 2019-03-04 | End: 2019-03-04 | Stop reason: HOSPADM

## 2019-03-04 RX ORDER — SODIUM CHLORIDE, SODIUM LACTATE, POTASSIUM CHLORIDE, CALCIUM CHLORIDE 600; 310; 30; 20 MG/100ML; MG/100ML; MG/100ML; MG/100ML
1000 INJECTION, SOLUTION INTRAVENOUS CONTINUOUS
Status: DISCONTINUED | OUTPATIENT
Start: 2019-03-04 | End: 2019-03-04 | Stop reason: HOSPADM

## 2019-03-04 RX ORDER — LIDOCAINE HYDROCHLORIDE 20 MG/ML
INJECTION, SOLUTION EPIDURAL; INFILTRATION; INTRACAUDAL; PERINEURAL AS NEEDED
Status: DISCONTINUED | OUTPATIENT
Start: 2019-03-04 | End: 2019-03-04 | Stop reason: HOSPADM

## 2019-03-04 RX ORDER — SODIUM CHLORIDE, SODIUM LACTATE, POTASSIUM CHLORIDE, CALCIUM CHLORIDE 600; 310; 30; 20 MG/100ML; MG/100ML; MG/100ML; MG/100ML
75 INJECTION, SOLUTION INTRAVENOUS CONTINUOUS
Status: DISCONTINUED | OUTPATIENT
Start: 2019-03-04 | End: 2019-03-04 | Stop reason: HOSPADM

## 2019-03-04 RX ORDER — FENTANYL CITRATE 50 UG/ML
INJECTION, SOLUTION INTRAMUSCULAR; INTRAVENOUS AS NEEDED
Status: DISCONTINUED | OUTPATIENT
Start: 2019-03-04 | End: 2019-03-04 | Stop reason: HOSPADM

## 2019-03-04 RX ORDER — NALOXONE HYDROCHLORIDE 0.4 MG/ML
0.1 INJECTION, SOLUTION INTRAMUSCULAR; INTRAVENOUS; SUBCUTANEOUS AS NEEDED
Status: DISCONTINUED | OUTPATIENT
Start: 2019-03-04 | End: 2019-03-04 | Stop reason: HOSPADM

## 2019-03-04 RX ORDER — OXYCODONE HYDROCHLORIDE 5 MG/1
10 TABLET ORAL
Status: DISCONTINUED | OUTPATIENT
Start: 2019-03-04 | End: 2019-03-04 | Stop reason: HOSPADM

## 2019-03-04 RX ORDER — CEFAZOLIN SODIUM/WATER 2 G/20 ML
2 SYRINGE (ML) INTRAVENOUS ONCE
Status: COMPLETED | OUTPATIENT
Start: 2019-03-04 | End: 2019-03-04

## 2019-03-04 RX ORDER — ONDANSETRON 2 MG/ML
4 INJECTION INTRAMUSCULAR; INTRAVENOUS ONCE
Status: DISCONTINUED | OUTPATIENT
Start: 2019-03-04 | End: 2019-03-04 | Stop reason: HOSPADM

## 2019-03-04 RX ORDER — SODIUM CHLORIDE 0.9 % (FLUSH) 0.9 %
5-40 SYRINGE (ML) INJECTION EVERY 8 HOURS
Status: CANCELLED | OUTPATIENT
Start: 2019-03-04

## 2019-03-04 RX ORDER — LIDOCAINE HYDROCHLORIDE 10 MG/ML
0.1 INJECTION INFILTRATION; PERINEURAL AS NEEDED
Status: DISCONTINUED | OUTPATIENT
Start: 2019-03-04 | End: 2019-03-04 | Stop reason: HOSPADM

## 2019-03-04 RX ORDER — OXYCODONE HYDROCHLORIDE 5 MG/1
5 TABLET ORAL
Status: DISCONTINUED | OUTPATIENT
Start: 2019-03-04 | End: 2019-03-04 | Stop reason: HOSPADM

## 2019-03-04 RX ORDER — PROPOFOL 10 MG/ML
INJECTION, EMULSION INTRAVENOUS AS NEEDED
Status: DISCONTINUED | OUTPATIENT
Start: 2019-03-04 | End: 2019-03-04 | Stop reason: HOSPADM

## 2019-03-04 RX ORDER — ACETAMINOPHEN 500 MG
500 TABLET ORAL ONCE
Status: DISCONTINUED | OUTPATIENT
Start: 2019-03-04 | End: 2019-03-04 | Stop reason: HOSPADM

## 2019-03-04 RX ORDER — ONDANSETRON 2 MG/ML
INJECTION INTRAMUSCULAR; INTRAVENOUS AS NEEDED
Status: DISCONTINUED | OUTPATIENT
Start: 2019-03-04 | End: 2019-03-04 | Stop reason: HOSPADM

## 2019-03-04 RX ORDER — SODIUM CHLORIDE 9 MG/ML
25 INJECTION, SOLUTION INTRAVENOUS AS NEEDED
Status: CANCELLED | OUTPATIENT
Start: 2019-03-04

## 2019-03-04 RX ORDER — HYDROMORPHONE HYDROCHLORIDE 2 MG/ML
0.5 INJECTION, SOLUTION INTRAMUSCULAR; INTRAVENOUS; SUBCUTANEOUS
Status: DISCONTINUED | OUTPATIENT
Start: 2019-03-04 | End: 2019-03-04 | Stop reason: HOSPADM

## 2019-03-04 RX ORDER — FENTANYL CITRATE 50 UG/ML
100 INJECTION, SOLUTION INTRAMUSCULAR; INTRAVENOUS AS NEEDED
Status: DISCONTINUED | OUTPATIENT
Start: 2019-03-04 | End: 2019-03-04 | Stop reason: HOSPADM

## 2019-03-04 RX ORDER — SODIUM CHLORIDE 0.9 % (FLUSH) 0.9 %
5-40 SYRINGE (ML) INJECTION AS NEEDED
Status: CANCELLED | OUTPATIENT
Start: 2019-03-04

## 2019-03-04 RX ORDER — DEXAMETHASONE SODIUM PHOSPHATE 4 MG/ML
INJECTION, SOLUTION INTRA-ARTICULAR; INTRALESIONAL; INTRAMUSCULAR; INTRAVENOUS; SOFT TISSUE AS NEEDED
Status: DISCONTINUED | OUTPATIENT
Start: 2019-03-04 | End: 2019-03-04 | Stop reason: HOSPADM

## 2019-03-04 RX ORDER — DIPHENHYDRAMINE HYDROCHLORIDE 50 MG/ML
12.5 INJECTION, SOLUTION INTRAMUSCULAR; INTRAVENOUS ONCE
Status: DISCONTINUED | OUTPATIENT
Start: 2019-03-04 | End: 2019-03-04 | Stop reason: HOSPADM

## 2019-03-04 RX ORDER — BUPIVACAINE HYDROCHLORIDE 5 MG/ML
INJECTION, SOLUTION EPIDURAL; INTRACAUDAL AS NEEDED
Status: DISCONTINUED | OUTPATIENT
Start: 2019-03-04 | End: 2019-03-04 | Stop reason: HOSPADM

## 2019-03-04 RX ADMIN — HYDROMORPHONE HYDROCHLORIDE 0.5 MG: 2 INJECTION, SOLUTION INTRAMUSCULAR; INTRAVENOUS; SUBCUTANEOUS at 15:37

## 2019-03-04 RX ADMIN — PROPOFOL 110 MG: 10 INJECTION, EMULSION INTRAVENOUS at 14:44

## 2019-03-04 RX ADMIN — FENTANYL CITRATE 25 MCG: 50 INJECTION, SOLUTION INTRAMUSCULAR; INTRAVENOUS at 14:44

## 2019-03-04 RX ADMIN — FENTANYL CITRATE 25 MCG: 50 INJECTION, SOLUTION INTRAMUSCULAR; INTRAVENOUS at 15:05

## 2019-03-04 RX ADMIN — FENTANYL CITRATE 25 MCG: 50 INJECTION, SOLUTION INTRAMUSCULAR; INTRAVENOUS at 15:03

## 2019-03-04 RX ADMIN — SODIUM CHLORIDE, SODIUM LACTATE, POTASSIUM CHLORIDE, AND CALCIUM CHLORIDE 1000 ML: 600; 310; 30; 20 INJECTION, SOLUTION INTRAVENOUS at 14:23

## 2019-03-04 RX ADMIN — PROPOFOL 50 MG: 10 INJECTION, EMULSION INTRAVENOUS at 14:47

## 2019-03-04 RX ADMIN — LIDOCAINE HYDROCHLORIDE 40 MG: 20 INJECTION, SOLUTION EPIDURAL; INFILTRATION; INTRACAUDAL; PERINEURAL at 14:44

## 2019-03-04 RX ADMIN — LIDOCAINE HYDROCHLORIDE 0.1 ML: 10 INJECTION, SOLUTION INFILTRATION; PERINEURAL at 14:23

## 2019-03-04 RX ADMIN — DEXAMETHASONE SODIUM PHOSPHATE 8 MG: 4 INJECTION, SOLUTION INTRA-ARTICULAR; INTRALESIONAL; INTRAMUSCULAR; INTRAVENOUS; SOFT TISSUE at 14:57

## 2019-03-04 RX ADMIN — FENTANYL CITRATE 25 MCG: 50 INJECTION, SOLUTION INTRAMUSCULAR; INTRAVENOUS at 15:00

## 2019-03-04 RX ADMIN — Medication 2 G: at 14:40

## 2019-03-04 RX ADMIN — SODIUM CHLORIDE, SODIUM LACTATE, POTASSIUM CHLORIDE, AND CALCIUM CHLORIDE: 600; 310; 30; 20 INJECTION, SOLUTION INTRAVENOUS at 14:40

## 2019-03-04 RX ADMIN — EPHEDRINE SULFATE 10 MG: 50 INJECTION, SOLUTION INTRAVENOUS at 14:55

## 2019-03-04 RX ADMIN — ONDANSETRON 4 MG: 2 INJECTION INTRAMUSCULAR; INTRAVENOUS at 14:57

## 2019-03-04 NOTE — ANESTHESIA POSTPROCEDURE EVALUATION
Procedure(s): RIGHT BREAST BIOPSY. Anesthesia Post Evaluation Patient location during evaluation: bedside Patient participation: complete - patient participated Level of consciousness: responsive to verbal stimuli Pain management: satisfactory to patient Airway patency: patent Anesthetic complications: no 
Cardiovascular status: hemodynamically stable Respiratory status: spontaneous ventilation Hydration status: stable Visit Vitals /70 (BP 1 Location: Right arm, BP Patient Position: At rest) Pulse 67 Temp 36.3 °C (97.4 °F) Resp 16 Ht 5' 3.25\" (1.607 m) Wt 64.9 kg (143 lb) SpO2 99% BMI 25.13 kg/m²

## 2019-03-04 NOTE — BRIEF OP NOTE
BRIEF OPERATIVE NOTE Date of Procedure: 3/4/2019 Preoperative Diagnosis: Malignant neoplasm of overlapping sites of right female breast, unspecified estrogen receptor status ([facility] Nelma Kent Breast mass [N63.0] Intraductal hyperplasia without atypia of breast [N60.99] Postoperative Diagnosis: Malignant neoplasm of overlapping sites of right female breast, unspecified estrogen receptor status ([address] Nelma Abraham Breast mass [N63.0] Intraductal hyperplasia without atypia of breast [N60.99] Procedure(s): RIGHT BREAST BIOPSY Surgeon(s) and Role: Lily Rg MD - Primary Surgical Assistant: None Surgical Staff: 
Circ-1: Syd Tyson RN 
Circ-2: David Blizzard, RN Scrub Tech-1: Tamara Diaz Scrub Tech-2: Nati Alejandre Loa [employer] Event Time In Time Out Incision Start [id number] Incision Close Anesthesia: General  
Estimated Blood Loss: Minimal 
Specimens:  
ID Type Source Tests Collected by Time Destination 1 : RIGHT BREAST SUPERIOR MARGIN Preservative Breast  Lily Rg MD 3/4/2019 1500 Pathology 2 : RIGHT BREAST INFERIOR MARGIN  Preservative Breast  Lily Rg MD 3/4/2019 1501 Pathology 3 : RIGHT BREAST LATERAL MARGIN Preservative Breast  Lily Rg MD 3/4/2019 1504 Pathology Findings: Margins excised - superior, inferior and lateral  
Complications: None Implants: * No implants in log *

## 2019-03-04 NOTE — DISCHARGE INSTRUCTIONS
No bathtub or pool for 2 weeks. Wear a sport bra for support for the next 3-4 weeks. Remove the Plastic dressing and gauze in 2 days. Leave the Steri-strips intact, they will fall off on their own. Take tylenol or ibuprofen for as needed for mild pain every 4-6 hours. Take Colace 100mg BID (over the counter) if constipated.

## 2019-03-04 NOTE — ANESTHESIA PREPROCEDURE EVALUATION
Anesthetic History No history of anesthetic complications Review of Systems / Medical History Patient summary reviewed and pertinent labs reviewed Pulmonary Within defined limits Neuro/Psych Psychiatric history Cardiovascular Hypertension Exercise tolerance: >4 METS 
  
GI/Hepatic/Renal 
Within defined limits Endo/Other Arthritis and cancer (breast) Other Findings Physical Exam 
 
Airway Mallampati: II 
TM Distance: 4 - 6 cm Neck ROM: normal range of motion Mouth opening: Normal 
 
 Cardiovascular Regular rate and rhythm,  S1 and S2 normal,  no murmur, click, rub, or gallop Rhythm: regular Rate: normal 
 
 
Pertinent negatives: No murmur Dental 
 
Dentition: Implants Pulmonary Breath sounds clear to auscultation Abdominal 
GI exam deferred Other Findings Anesthetic Plan ASA: 2 Anesthesia type: general 
 
 
 
 
Induction: Intravenous Anesthetic plan and risks discussed with: Patient LMA

## 2019-03-04 NOTE — OP NOTES
Operative Report    Name: Citlali Dejesus      MRN: [id number]       : 1935       Age: 82 y.o. Sex: female    Date of Surgery: 3/4/2019     Preoperative Diagnosis: Malignant neoplasm of overlapping sites of right female breast, unspecified estrogen receptor status ([facility] [address] [C50.811]  Breast mass [N63.0]  Intraductal hyperplasia without atypia of breast [N60.99]     Postoperative Diagnosis: Malignant neoplasm of overlapping sites of right female breast, unspecified estrogen receptor status ([facility] [address] [C50.811]  Breast mass [N63.0]  Intraductal hyperplasia without atypia of breast [N60.99]     Name of procedure:  Procedure(s):  RIGHT BREAST BIOPSY. Surgeon:  Surgeon(s) and Role:     * Rakel Jewell MD - Primary    Anesthesia: General     Complications: None    Estimated Blood Loss: Minimal           Specimens:   ID Type Source Tests Collected by Time Destination   1 : RIGHT BREAST SUPERIOR MARGIN Preservative Breast  Rakel Jewell MD 3/4/2019 1500 Pathology   2 : RIGHT BREAST INFERIOR MARGIN  Preservative Breast  Rakel Jewell MD 3/4/2019 1501 Pathology   3 : RIGHT BREAST LATERAL MARGIN Preservative Breast  Rakel Jewell MD 3/4/2019 1504 Pathology       Statement of Medical Necessity: Citlali Dejesus is a 82 y.o. female with right breast cancer s/p excision last week. Her lateral superior and inferior margins were negative but less than 1 cm. She was reexcision of margins and she agreed to proceed. We discussed risks, benefits and alternatives of surgery. Patient understood and agreed to proceed. Procedure Details   After informed consent was taken, patient was taken to the operating room and placed in supine position. Anesthesia was induced and patient was intubated. Patient received preoperative antibiotics. right breast was prepped and draped in standard sterile fashion. A timeout was performed with all team members present.     The previous right breast incision was reopened. There was a small amount of seroma fluid drained. This was carried down with small superior and inferior flaps of subcutaneous tissues. Atotal of 12mm of the superior, inferior and lateral margins were resected. They were marked with a short suture superiorly and a long suture laterally. The specimen was sent to pathology. There was no evidence of gross or palpable disease remaining. The cavity was irrigated and suctioned. Hemostasis was achieved.  The area was infiltrated with local anesthetic. The deep dermis was closed with interrupted 3-0 Vicryl. The skin was closed with 4-0 Monocryl subcuticular sutures. Mastisol and Steri-Strips were applied to the wound. A sterile dressing was placed consisting of dry 2x2 gauze and Tegaderm.  Sponge and needle counts were correct ath the end of the procedure.  The patient tolerated the procedure well.  The patient was transferred to recovery room in stable condition.        Signed by: Stephan Brewer MD  [facility] - Bariatric & Minimally Invasive Surgery  3/4/2019 3:22 PM

## 2019-03-21 ENCOUNTER — HOSPITAL ENCOUNTER (OUTPATIENT)
Dept: LAB | Age: 84
Discharge: HOME | End: 2019-03-21
Payer: MEDICARE

## 2019-03-21 DIAGNOSIS — C50.911 MALIGNANT NEOPLASM OF RIGHT BREAST IN FEMALE, ESTROGEN RECEPTOR POSITIVE, UNSPECIFIED SITE OF BREAST (HCC): ICD-10-CM

## 2019-03-21 DIAGNOSIS — Z17.0 MALIGNANT NEOPLASM OF RIGHT BREAST IN FEMALE, ESTROGEN RECEPTOR POSITIVE, UNSPECIFIED SITE OF BREAST (HCC): ICD-10-CM

## 2019-03-21 PROBLEM — D05.10 BREAST NEOPLASM, TIS (DCIS): Status: ACTIVE | Noted: 2019-03-21

## 2019-03-21 LAB
ALBUMIN SERPL-MCNC: 4.4 G/DL (ref 3.2–4.6)
ALBUMIN/GLOB SERPL: 1.3 {RATIO} (ref 1.2–3.5)
ALP SERPL-CCNC: 94 U/L (ref 50–136)
ALT SERPL-CCNC: 19 U/L (ref 12–65)
ANION GAP SERPL CALC-SCNC: 4 MMOL/L (ref 7–16)
AST SERPL-CCNC: 20 U/L (ref 15–37)
BASOPHILS # BLD: 0 K/UL (ref 0–0.2)
BASOPHILS NFR BLD: 1 % (ref 0–2)
BILIRUB SERPL-MCNC: 0.8 MG/DL (ref 0.2–1.1)
BUN SERPL-MCNC: 22 MG/DL (ref 8–23)
CALCIUM SERPL-MCNC: 9.7 MG/DL (ref 8.3–10.4)
CHLORIDE SERPL-SCNC: 108 MMOL/L (ref 98–107)
CO2 SERPL-SCNC: 27 MMOL/L (ref 21–32)
CREAT SERPL-MCNC: 0.8 MG/DL (ref 0.6–1)
DIFFERENTIAL METHOD BLD: ABNORMAL
EOSINOPHIL # BLD: 0.5 K/UL (ref 0–0.8)
EOSINOPHIL NFR BLD: 9 % (ref 0.5–7.8)
ERYTHROCYTE [DISTWIDTH] IN BLOOD BY AUTOMATED COUNT: 13.1 % (ref 11.9–14.6)
GLOBULIN SER CALC-MCNC: 3.3 G/DL (ref 2.3–3.5)
GLUCOSE SERPL-MCNC: 95 MG/DL (ref 65–100)
HCT VFR BLD AUTO: 41.9 % (ref 35.8–46.3)
HGB BLD-MCNC: 13.5 G/DL (ref 11.7–15.4)
IMM GRANULOCYTES # BLD AUTO: 0 K/UL (ref 0–0.5)
IMM GRANULOCYTES NFR BLD AUTO: 0 % (ref 0–5)
LYMPHOCYTES # BLD: 1.3 K/UL (ref 0.5–4.6)
LYMPHOCYTES NFR BLD: 24 % (ref 13–44)
MCH RBC QN AUTO: 29.7 PG (ref 26.1–32.9)
MCHC RBC AUTO-ENTMCNC: 32.2 G/DL (ref 31.4–35)
MCV RBC AUTO: 92.1 FL (ref 79.6–97.8)
MONOCYTES # BLD: 0.7 K/UL (ref 0.1–1.3)
MONOCYTES NFR BLD: 13 % (ref 4–12)
NEUTS SEG # BLD: 2.8 K/UL (ref 1.7–8.2)
NEUTS SEG NFR BLD: 53 % (ref 43–78)
NRBC # BLD: 0 K/UL (ref 0–0.2)
PLATELET # BLD AUTO: 237 K/UL (ref 150–450)
PMV BLD AUTO: 9.8 FL (ref 9.4–12.3)
POTASSIUM SERPL-SCNC: 4.4 MMOL/L (ref 3.5–5.1)
PROT SERPL-MCNC: 7.7 G/DL (ref 6.3–8.2)
RBC # BLD AUTO: 4.55 M/UL (ref 4.05–5.25)
SODIUM SERPL-SCNC: 139 MMOL/L (ref 136–145)
WBC # BLD AUTO: 5.3 K/UL (ref 4.3–11.1)

## 2019-03-21 PROCEDURE — 80053 COMPREHEN METABOLIC PANEL: CPT

## 2019-03-21 PROCEDURE — 36415 COLL VENOUS BLD VENIPUNCTURE: CPT

## 2019-03-21 PROCEDURE — 85025 COMPLETE CBC W/AUTO DIFF WBC: CPT

## 2019-03-24 ENCOUNTER — PATIENT OUTREACH (OUTPATIENT)
Dept: CASE MANAGEMENT | Age: 84
End: 2019-03-24

## 2019-04-10 ENCOUNTER — HOSPITAL ENCOUNTER (OUTPATIENT)
Dept: RADIATION ONCOLOGY | Age: 84
Discharge: HOME | End: 2019-04-10
Payer: MEDICARE

## 2019-04-10 VITALS
SYSTOLIC BLOOD PRESSURE: 140 MMHG | OXYGEN SATURATION: 98 % | TEMPERATURE: 97.6 F | DIASTOLIC BLOOD PRESSURE: 79 MMHG | WEIGHT: 143.8 LBS | BODY MASS INDEX: 25.27 KG/M2 | HEART RATE: 55 BPM

## 2019-04-10 PROCEDURE — 99211 OFF/OP EST MAY X REQ PHY/QHP: CPT

## 2019-04-10 NOTE — CONSULTS
Patient: Betzy Lewis MRN: [id number]  SSN: [id number]    YOB: 1935  Age: 82 y.o. Sex: female      Other Providers:  Opal Early MD, Opal Buenrostro MD    CHIEF COMPLAINT: Breast cancer    DIAGNOSIS: Right breast invasive ductal carcinoma, %, PR65% and Her2 Cedric negative (+1). Prior right sided early stage breast cancer s/p lumpectomy and radiation. PREVIOUS TREATMENT:  1) Partial mastectomy 2/25/19  2) Re-excision 3/4/19    HISTORY OF PRESENT ILLNESS:  Betzy Lewis is a 82 y.o. female who I am seeing at the request of Georgette Poon. She presented after undergoing routine mammogram on 12/12/18 that showed a superficial right breast mass at 5:00 6 cm from the nipple. On 12/17/18 she had a core biopsy and path was c/w IDC with lobular features, low grade, %PR65% and Her2 Cedric negative (+1).  She met with Dr Yelitza Maradiaga and after extensive discussion she wished to proceed with lumpectomy. Amita Gentile has hx of previous breast surgery with lumpectomy and XRT in 2007, 3400 cGy in 10 fractions, 10 BID with Mammosite. MRI showed additional sites of concern and she underwent bx showing intraductal hyperplasia. 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 The total tumor size was 0.6 cm. She then went on to complete re-excision which again showed an area <1 mm. She also met with Dr Israel Vera in plastic surgery. Dr. Georgette Poon recommended oncotype testing and referral to see us in radiation. The oncotype score returned as 33. She also met back with Dr. Yelitza Maradiaga 3/12/19 who recommended completion mastectomy. She was willing to complete mastectomy with hope for reconstruction but wanted to determine if this could be avoided with another course of radiation. OBSTETRIC HISTORY:    Menarche at the age of 17. G5,P5.     Oral Contraceptive Pills:  None  Hormone Replacement Therapy:  None    PAST MEDICAL HISTORY:    Past Medical History:   Diagnosis Date    Depression     H/O bladder infections     pt reports after bilateral TKA- had herron once and didn't have herron once but had \"issues\" after both surgeries    HX: breast cancer 2007    RT Lumpectomy    Hypertension     Osteoarthritis     Overactive bladder     Radiation therapy complication 8610       The patient denies history of collagen vascular diseases, pacemaker insertion, but has had prior radiation and prior chemotherapy. PAST SURGICAL HISTORY:   Past Surgical History:   Procedure Laterality Date    HX BREAST BIOPSY Right 09/2007    RT Lumpectomy    HX BREAST BIOPSY Right 3/4/2019    RIGHT BREAST BIOPSY performed by [facility] at [facility] [address] HX BREAST LUMPECTOMY Right 09/05/2007    HX CATARACT REMOVAL Bilateral     HX HYSTERECTOMY      HX KNEE REPLACEMENT Left 2018    HX KNEE REPLACEMENT Right     HX MASTECTOMY Right 2/25/2019    RIGHT PARTIAL BREAST MASTECTOMY performed by Isaias Bonilla MD at [id number] [place] [address] [place]:     Current Outpatient Medications:     losartan (COZAAR) 50 mg tablet, Take  by mouth daily. , Disp: , Rfl:     OXYBUTYNIN CHLORIDE PO, Take  by mouth three (3) times daily. 1/2 pill three times a day, Disp: , Rfl:     multivitamin (ONE A DAY) tablet, Take 1 Tab by mouth daily. , Disp: , Rfl:     cholecalciferol (VITAMIN D3) 1,000 unit cap, Take 1,000 Units by mouth daily. , Disp: , Rfl:     magnesium oxide (MAG-OX) 400 mg tablet, Take 400 mg by mouth daily. , Disp: , Rfl:     glucosam-chondro-herb 149-hyal (GLUCOS CHOND CPLX ADVANCED PO), Take 1 Tab by mouth daily. , Disp: , Rfl:     polyethylene glycol (MIRALAX) 17 gram/dose powder, Take 17 g by mouth daily. , Disp: , Rfl:     B.infantis-B.ani-B.long-B.bifi (PROBIOTIC 4X) 10-15 mg TbEC, Take 1 Tab by mouth daily. , Disp: , Rfl:     ascorbic acid, vitamin C, (VITAMIN C) 500 mg tablet, Take 500 mg by mouth daily. , Disp: , Rfl:     cyanocobalamin (VITAMIN B-12) 100 mcg tablet, Take 100 mcg by mouth daily. , Disp: , Rfl:     escitalopram oxalate (LEXAPRO) 5 mg tablet, Take 5 mg by mouth daily. , Disp: , Rfl: 5    meloxicam (MOBIC) 15 mg tablet, Take 15 mg by mouth daily. , Disp: , Rfl: 1    metoprolol succinate (TOPROL-XL) 25 mg XL tablet, Take 25 mg by mouth daily. , Disp: , Rfl: 1    ALLERGIES:   No Known Allergies    SOCIAL HISTORY:   Social History     Socioeconomic History    Marital status:      Spouse name: Not on file    Number of children: Not on file    Years of education: Not on file    Highest education level: Not on file   Occupational History    Not on file   Social Needs    Financial resource strain: Not on file    Food insecurity:     Worry: Not on file     Inability: Not on file    Transportation needs:     Medical: Not on file     Non-medical: Not on file   Tobacco Use    Smoking status: Never Smoker    Smokeless tobacco: Never Used   Substance and Sexual Activity    Alcohol use:  Yes     Alcohol/week: 4.2 oz     Types: 7 Glasses of wine per week    Drug use: No    Sexual activity: Not on file   Lifestyle    Physical activity:     Days per week: Not on file     Minutes per session: Not on file    Stress: Not on file   Relationships    Social connections:     Talks on phone: Not on file     Gets together: Not on file     Attends [employer] service: Not on file     Active member of club or organization: Not on file     Attends meetings of clubs or organizations: Not on file     Relationship status: Not on file    Intimate partner violence:     Fear of current or ex partner: Not on file     Emotionally abused: Not on file     Physically abused: Not on file     Forced sexual activity: Not on file   Other Topics Concern     Service Not Asked    Blood Transfusions Not Asked    Caffeine Concern Not Asked    Occupational Exposure Not Asked   Cynthia Kipper Hazards Not Asked    Sleep Concern Not Asked    Stress Concern Not Asked    Weight Concern Not Asked    Special Diet Not Asked    Back Care Not Asked    Exercise Not Asked    Bike Helmet Not Asked   [address] [facility] Not Asked    Self-Exams Not Asked   Social History Narrative    Not on file       FAMILY HISTORY:   Family History   Problem Relation Age of Onset    Breast Cancer Maternal Aunt 28    Heart Disease Mother     Stroke Mother     Heart Attack Father     Cancer Sister        REVIEW OF SYSTEMS: Please see the completed review of systems sheet in the chart that I have reviewed today. PHYSICAL EXAMINATION:   ECOG Performance status 1  VITAL SIGNS:   Visit Vitals  /79 (BP 1 Location: Left arm, BP Patient Position: Sitting)   Pulse (!) 55   Temp 97.6 °F (36.4 °C)   Wt 65.2 kg (143 lb 12.8 oz)   SpO2 98%   BMI 25.27 kg/m²        GENERAL: The patient is well-developed, ambulatory, alert and in no acute distress. HEENT: Head is normocephalic, atraumatic. Pupils are equal, round and reactive to light and accommodation. Extraocular movement intact. Hearing is intact bilaterally to finger rub. Oral cavity reveals no lesions. Mucous membranes are moist. NECK: Neck is supple with no masses. CARDIOVASCULAR: Heart is regular rate and rhythm. There are no murmurs rubs or gallups. Radial pulses are 2+ RESPIRATORY: Lungs are clear to auscultation and percussion. There is normal respiratory effort. GASTROINTESTINAL: The abdomen is soft, non-tender, nondistended with no hepatospelnomagaly. Digital rectal examination: deferred LYMPHATIC: There is no cervical, supraclavicular or axillary lymphadenopathy bilaterally. MUSCULOSKELETAL: Extremities reveal no cyanosis, clubbing or edema.  is 5+/5. BREASTS: Examination of the unaffected breast reveals no dominant nodules or masses. The lumpectomy. partial mastectomy area appears well healed. No sign of recurrence. Well healed surgical incision. NEURO:  Cranial nerves II-XII grossly intact.   Muscular strength and sensation are intact throughout all four extremities. PATHOLOGY:                                                         LABORATORY:   Lab Results   Component Value Date/Time    Sodium 139 03/21/2019 01:05 PM    Potassium 4.4 03/21/2019 01:05 PM    Chloride 108 (H) 03/21/2019 01:05 PM    CO2 27 03/21/2019 01:05 PM    Anion gap 4 (L) 03/21/2019 01:05 PM    Glucose 95 03/21/2019 01:05 PM    BUN 22 03/21/2019 01:05 PM    Creatinine 0.80 03/21/2019 01:05 PM    GFR est AA >60 03/21/2019 01:05 PM    GFR est non-AA >60 03/21/2019 01:05 PM    Calcium 9.7 03/21/2019 01:05 PM    Albumin 4.4 03/21/2019 01:05 PM    Protein, total 7.7 03/21/2019 01:05 PM    Globulin 3.3 03/21/2019 01:05 PM    A-G Ratio 1.3 03/21/2019 01:05 PM    AST (SGOT) 20 03/21/2019 01:05 PM    ALT (SGPT) 19 03/21/2019 01:05 PM     Lab Results   Component Value Date/Time    WBC 5.3 03/21/2019 01:05 PM    HGB 13.5 03/21/2019 01:05 PM    HCT 41.9 03/21/2019 01:05 PM    PLATELET 768 48/57/5006 01:05 PM       RADIOLOGY:    Mri Breast Bi W Wo Cont    Result Date: 1/24/2019  BREAST MRI BEFORE AND AFTER CONTRAST CLINICAL HISTORY: Recent needle biopsy diagnosis of palpable right breast IDC with lobular features; for pretreatment evaluation in an 82year-old status post remote right lumpectomy and radiation therapy. TECHNIQUE: Standard axial and sagittal images were obtained before and during bolus injection of 13 cc of Dotarem IV. CAD was employed. COMPARISON:  Multiple prior studies including bilateral diagnostic mammography and right ultrasound of December 12, 2018 as well as right ultrasound biopsy of December 17, 2018. FINDINGS: There is mild background parenchymal enhancement in the right breast with postoperative scarring and only minimal background parenchymal enhancement on the left. The right 5:00 position IVC has maximum diameter of approximately 6 mm MRI, and no satellite nodule identified.   However, there is asymmetric linear enhancement extending posteriorly from it over approximately 3 cm which is suspicious for DCIS. There are questionable associated calcifications seen on mammographic images from December 12, although they are not well distinguished from overlying vascular calcifications in the lateral projection. No pathologically enlarged or dysmorphic lymph nodes are seen. The liver, lungs, and chest wall appear unremarkable as imaged. IMPRESSION:  1. IN ADDITION TO THE SMALL, SUPERFICIAL RIGHT 5:00 INVASIVE DUCTAL CARCINOMA, THERE IS LINEAR ENHANCEMENT EXTENDING APPROXIMATELY 3 CM POSTERIORLY FROM IT WHICH IS SUSPICIOUS FOR DCIS AND WHICH MAY BE ASSOCIATED WITH FAINT MAMMOGRAPHIC MICROCALCIFICATIONS. IF BREAST-CONSERVING THERAPY IS CONTEMPLATED, THEN RIGHT DIAGNOSTIC MAMMOGRAPHY WITH 1.8 MAGNIFICATION IMAGES WOULD BE USEFUL TO FURTHER EVALUATE THE CALCIFICATIONS FOR POSSIBLE STEREOTACTIC BIOPSY. 2.  NO EVIDENCE OF CONTRALATERAL MALIGNANCY OR OF METASTATIC DISEASE IN THE IMAGED CHEST. BI-RADS Assessment Category 6: Known Biopsy Proven Malignancy This case was reviewed in consultation with colleagues. [facility] Mammo Rt Dx Incl Cad    Result Date: 2/5/2019  RIGHT BREAST DIAGNOSTIC MAMMOGRAM, 2/5/2019. CLINICAL HISTORY: Linear enhancement seen on breast MRI. COMPARISON STUDY: Breast MRI 1/24/2019. FINDINGS: CC, straight mediolateral as well as magnification images of the right breast in the CC, and ML plane are submitted for evaluation. Previously, linear enhancement was seen extending into the more posterior, outer right breast from the patient's known right breast IDC. Although faint calcifications are seen in the outer right breast on the spot magnification CC view, and faint calcifications are seen in the inferior right breast on the spot magnification ML view; it is difficult to confirm that these represent the same calcifications.  Calcifications in the inferior right breast closely approximate a vascular structure and there is some suspicion that at least some of the visualized calcifications represent vascular calcifications. Given that it cannot be definitively confirmed that these correspond to linear enhancement seen on the breast MRI, it is felt that the prior MRI findings should be further assessed with an MRI guided biopsy. IMPRESSION: 1. Inability to confirm concerning calcifications at the level of prior linear enhancement. Therefore, the prior MRI findings should be further assessed with MRI guided biopsy. BI-RADS Assessment Category 4: Suspicious Finding- Biopsy should be considered. Veronica Post Bx Imaging Rt Incl Cad    Result Date: 2/19/2019  ADDENDUM, 2/19/2019 Pathology was noted as Right breast, 7:00 position, 3 cm from nipple, core biopsy: Fibrocystic mastopathy having minimal intraductal hyperplasia. Results concordant with imaging, and continuation with treatment planning for the known right 5:00 IDC. Follow-up MRI is also recommended in one year per the percutaneous core needle biopsy protocol. Patient will be notified of results and follow- up recommendations. RIGHT BREAST CONTRASTED MR-GUIDED PERCUTANEOUS CORE NEEDLE BIOPSY AND MARKER CLIP PLACEMENT, RIGHT DIAGNOSTIC DIGITAL MAMMOGRAPHY CLINICAL HISTORY:  Followup abnormal pretreatment MRI after recent needle biopsy diagnosis of palpable right breast IDC with lobular features in an 82year-old status post remote right lumpectomy and radiation therapy; for histologic diagnosis. BIOPSY AND CLIPS PLACEMENT:  Written informed consent was obtained. With the right breast compressed in the [place] biopsy grid, standard axial and sagittal images were obtained before and after bolus injection of 20 cc of Dotarem IV. The patient vomited after injection of the contrast, and associated motion resulting in suboptimal initial images.   During initial local anesthesia using standard aseptic technique, the patient reported significant pain and moved again, requiring repeat localization imaging. Again using standard aseptic technique and 1% Xylocaine local anesthesia, an 8-gauge mammotome vacuum-assisted core biopsy needle was positioned adjacent to the 3 cm linear enhancement at 7:00 position centered approximately 3 cm from the nipple seen on MRI of January 24, 2019. A total of 8 core samples were obtained and placed in formalin for histologic evaluation. A titanium marker clip was placed through the needle, and repeat axial and sagittal images were obtained. POST-BIOPSY MAMMOGRAM:  Straight lateral and craniocaudal views demonstrate the biopsy marker clip displaced approximately 8 cm medial to the biopsy bed. The patient tolerated the procedure well without immediate complication and left the department in good condition. IMPRESSION: 1.  SUCCESSFUL CORE NEEDLE BIOPSY AND MARKER CLIP PLACEMENT FOR LINEAR ENHANCEMENT EXTENDING OVER APPROXIMATELY 3 CM AT THE POSTERIOR 7:00 POSITION. 2.  THE MARKER CLIP IS SIGNIFICANTLY DISPLACED FROM THE BIOPSY BED, WHICH SHOULD BE ACCOUNTED FOR IN ANY NECESSARY FUTURE LOCALIZATION. 3.  IF HISTOLOGY FAILS TO CONFIRM MALIGNANCY, THEN FOLLOWUP BREAST MRI IS RECOMMENDED IN 1 YEAR PER THE PERCUTANEOUS CORE NEEDLE BIOPSY PROTOCOL. Thank you for referral of this patient. Mri Bx Breast Vac Rt 1st Lesion W/clip And Specimen    Result Date: 2/19/2019  ADDENDUM, 2/19/2019 Pathology was noted as Right breast, 7:00 position, 3 cm from nipple, core biopsy: Fibrocystic mastopathy having minimal intraductal hyperplasia. Results concordant with imaging, and continuation with treatment planning for the known right 5:00 IDC. Follow-up MRI is also recommended in one year per the percutaneous core needle biopsy protocol. Patient will be notified of results and follow- up recommendations.  RIGHT BREAST CONTRASTED MR-GUIDED PERCUTANEOUS CORE NEEDLE BIOPSY AND MARKER CLIP PLACEMENT, RIGHT DIAGNOSTIC DIGITAL MAMMOGRAPHY CLINICAL HISTORY:  Followup abnormal pretreatment MRI after recent needle biopsy diagnosis of palpable right breast IDC with lobular features in an 82year-old status post remote right lumpectomy and radiation therapy; for histologic diagnosis. BIOPSY AND CLIPS PLACEMENT:  Written informed consent was obtained. With the right breast compressed in the [place] biopsy grid, standard axial and sagittal images were obtained before and after bolus injection of 20 cc of Dotarem IV. The patient vomited after injection of the contrast, and associated motion resulting in suboptimal initial images. During initial local anesthesia using standard aseptic technique, the patient reported significant pain and moved again, requiring repeat localization imaging. Again using standard aseptic technique and 1% Xylocaine local anesthesia, an 8-gauge mammotome vacuum-assisted core biopsy needle was positioned adjacent to the 3 cm linear enhancement at 7:00 position centered approximately 3 cm from the nipple seen on MRI of January 24, 2019. A total of 8 core samples were obtained and placed in formalin for histologic evaluation. A titanium marker clip was placed through the needle, and repeat axial and sagittal images were obtained. POST-BIOPSY MAMMOGRAM:  Straight lateral and craniocaudal views demonstrate the biopsy marker clip displaced approximately 8 cm medial to the biopsy bed. The patient tolerated the procedure well without immediate complication and left the department in good condition. IMPRESSION: 1.  SUCCESSFUL CORE NEEDLE BIOPSY AND MARKER CLIP PLACEMENT FOR LINEAR ENHANCEMENT EXTENDING OVER APPROXIMATELY 3 CM AT THE POSTERIOR 7:00 POSITION. 2.  THE MARKER CLIP IS SIGNIFICANTLY DISPLACED FROM THE BIOPSY BED, WHICH SHOULD BE ACCOUNTED FOR IN ANY NECESSARY FUTURE LOCALIZATION. 3.  IF HISTOLOGY FAILS TO CONFIRM MALIGNANCY, THEN FOLLOWUP BREAST MRI IS RECOMMENDED IN 1 YEAR PER THE PERCUTANEOUS CORE NEEDLE BIOPSY PROTOCOL.  Thank you for referral of this patient. IMPRESSION:  Jeremy Gonzalez is a 82 y.o. female with Stage IA breast cancer. Her case is certainly complicated by the prior breast history and the multifocal close margins that were seen on both excision and re-excision. Therefore she is planning for completion mastectomy. The natural history of breast cancer was reviewed with the patient. Prognostic features including stage, performance status and presence or absence of lymph node involvement were reviewed with the patient. Various treatment options including lumpectomy alone, breast conservation therapy, and mastectomy were compared and contrasted with regard to outcome and quality of life. We discussed the data in support of Radiation following mastectomy knowing this is presumably the direction she is going. We discussed the [employer] [place] and [place] clinical trials showing a survival benefit from the patient's receiving adjuvant radiation with chemotherapy as opposed to chemotherapy alone. This included patient's with T3, T4, and node positive disease. With such a small tumor, despite the wide area of DCIS, she would not qualify for adjuvant radiation and therefore I would certainly advocate for a completion mastectomy in her case. The practicalities, indications, benefits, side-effects and complications of radiation therapy were discussed. While I don't feel another course of radiation will be overly toxic, I would certainly prefer to avoid this and with a completion mastectomy needed for margin clearance, there are multiple reasons to defer radiation. I would only plan to therefore see her back if something further is found on completion mastectomy. I have not recommended a course of radiation therapy assuming she follows through with completion mastectomy. Plan:  1. Genetic testing-not indicated  2. Smoking cessation-not indicated  3.  Patient will be treated with completion mastectomy and assuming no further invasive disease or lymph nodes are noted, systemic therapy alone, no radiation.       Elissa Nieto MD   April 10, 2019

## 2019-04-10 NOTE — NURSE NAVIGATOR
Consult for right breast cancer. History of lumpectomy, RT in  at [facility] Tamoxifen x 5 yrs. Partial mastectomy 3-4-19. Oncotype DCIS 33. 
Pt states right breast is tender to touch. Pt is  5/ Para 5. Started menses around age 17. Menses ended with natural menopause. Had hysterectomy about 5 yrs ago,. No history of oral contraceptive use. History of HRT.  
 
Hansel Escobar RN

## 2019-04-12 ENCOUNTER — PATIENT OUTREACH (OUTPATIENT)
Dept: CASE MANAGEMENT | Age: 84
End: 2019-04-12

## 2019-04-12 NOTE — PROGRESS NOTES
4/12/19  Patient called today with anxiety regarding delay in Oncotype results - explained that testing had to be reordered on surgical biopsy as wrong specimen was sent and incorrect test was performed. She was advised by Dr. Shayy Barroso that, as long as she completes mastectomy, that no radiaiton is indicated. She now wishes to move forward with surgery rather than wait for oncotype results. We will refer back to surgery. Dr. Juan Tate advised. Will follow.

## 2019-04-18 NOTE — H&P (VIEW-ONLY)
Renita Jane MD  
Bariatric & Advanced [facility]ar[facility] Endoscopy [address] [facility], [address] Yudy Bennett Phone [phone]   Fax [phone] Date of visit: 2019 Primary/Requesting provider: Wilmer Collado MD  
 
Name: Martha Mariee     
MRN: [id number] : 1935 Age: 82 y.o. Sex: female CC:   
Chief Complaint Patient presents with  Follow-up HPI: 
 
 Martha Mariee is a 82 y.o. female who presents for follow up. 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 She underwent a second resection for margins and additional multifocal DCIS was found. She was seen for follow up and a total right mastectomy was recommended. At that time, she desire reconstruction and she saw Dr. Lesa Castillo who explained to her the risks/benefits/alternatives as well as recovery post surgery and she decided reconstruction was not for her. She has seen Dr. Jeanette Phan who also recommended mastectomy. She has had time to think about it and is here because she has decided to undergo total right mastectomy without reconstruction. PMH: 
 
Past Medical History:  
Diagnosis Date  Depression  H/O bladder infections   
 pt reports after bilateral TKA- had herron once and didn't have herron once but had \"issues\" after both surgeries  HX: breast cancer  RT Lumpectomy  Hypertension  Osteoarthritis  Overactive bladder  Radiation therapy complication 9319 PSH: 
 
Past Surgical History:  
Procedure Laterality Date  HX BREAST BIOPSY Right 2007 RT Lumpectomy  HX BREAST BIOPSY Right 3/4/2019 RIGHT BREAST BIOPSY performed by Kemar Valencia MD at [address] [place] [address] HX BREAST LUMPECTOMY Right 2007  HX CATARACT REMOVAL Bilateral   
 HX HYSTERECTOMY  HX KNEE REPLACEMENT Left   HX KNEE REPLACEMENT Right  HX MASTECTOMY Right 2/25/2019 RIGHT PARTIAL BREAST MASTECTOMY performed by Sol Gregory MD at [address] [place] [address]: 
 
Current Outpatient Medications Medication Sig  
 losartan (COZAAR) 50 mg tablet Take  by mouth daily.  OXYBUTYNIN CHLORIDE PO Take  by mouth three (3) times daily. 1/2 pill three times a day  multivitamin (ONE A DAY) tablet Take 1 Tab by mouth daily.  cholecalciferol (VITAMIN D3) 1,000 unit cap Take 1,000 Units by mouth daily.  magnesium oxide (MAG-OX) 400 mg tablet Take 400 mg by mouth daily.  glucosam-chondro-herb 149-hyal (GLUCOS CHOND CPLX ADVANCED PO) Take 1 Tab by mouth daily.  polyethylene glycol (MIRALAX) 17 gram/dose powder Take 17 g by mouth daily.  B.infantis-B.ani-B.long-B.bifi (PROBIOTIC 4X) 10-15 mg TbEC Take 1 Tab by mouth daily.  ascorbic acid, vitamin C, (VITAMIN C) 500 mg tablet Take 500 mg by mouth daily.  cyanocobalamin (VITAMIN B-12) 100 mcg tablet Take 100 mcg by mouth daily.  escitalopram oxalate (LEXAPRO) 5 mg tablet Take 5 mg by mouth daily.  meloxicam (MOBIC) 15 mg tablet Take 15 mg by mouth daily.  metoprolol succinate (TOPROL-XL) 25 mg XL tablet Take 25 mg by mouth daily. No current facility-administered medications for this visit. ALLERGIES:   
 
No Known Allergies SH: Social History Tobacco Use  Smoking status: Never Smoker  Smokeless tobacco: Never Used Substance Use Topics  Alcohol use: Yes Alcohol/week: 4.2 oz Types: 7 Glasses of wine per week  Drug use: No  
 
 
FH: 
 
Family History Problem Relation Age of Onset  Breast Cancer Maternal Aunt 28  
 Heart Disease Mother  Stroke Mother  Heart Attack Father  Cancer Sister Review of systems: 
Review of Systems Constitutional: Negative for chills, fever and weight loss. HENT: Negative. Eyes:  
     Reading glasses Respiratory: Negative for cough, hemoptysis, sputum production and shortness of breath. Cardiovascular: Negative for chest pain, palpitations, orthopnea, claudication, leg swelling and PND. Htn Gastrointestinal: Positive for constipation. Negative for abdominal pain, blood in stool, diarrhea, heartburn, nausea and vomiting. Genitourinary: Negative. Musculoskeletal: Positive for back pain, joint pain and neck pain. Neurological: Negative for seizures, loss of consciousness and headaches. Endo/Heme/Allergies:  
     Left breast cancer- Dr Refugio Griggs also saw radiation oncologist Dr April Santos Psychiatric/Behavioral: Positive for depression. Negative for hallucinations, substance abuse and suicidal ideas. The patient is not nervous/anxious. Anxiety Physical Exam:  
General:  Well-developed, well-nourished, no distress. Psych:  Cooperative, good insight and judgement. Neuro:  Alert, oriented to person, place and time. HEENT:  Normocephalic, atraumatic. Sclera clear. Lungs:  Unlabored breathing. Symmetrical chest expansion. Breast: right breast incision healing well, mildly ttp. No erythema, no drainage. Axilla: no palpable lymphadenopathy. Chest wall:  No tenderness or deformity. Heart:  Regular rate and rhythm. No JVD. Abdomen:  Soft, non-tender, non-distended. No palpable masses. Extremities:  Extremities normal, atraumatic, no cyanosis or edema. Skin:  Skin color, texture, turgor normal. No rashes. Labs: 
Normal Hgb. Normal WBC. ICD-10-CM ICD-9-CM 1. Malignant neoplasm of lower-outer quadrant of right breast of female, estrogen receptor positive (HCC) C50.511 174.5   
 Z17.0 V86.0 2. Ductal carcinoma in situ (DCIS) of right breast D05.11 233.0 3. Abnormal mammogram R92.8 793.80 Assessment/Plan:  Mary Pal is a 82 y.o. female who has right breast cancer with multifocal DCIS 
 
 1.  All breast images were independently reviewed by me. 2. Biopsy Path reviewed. 3. I reviewed her labs personally. 4.  Medical Oncology: Dr. Adam Flores. Follow up after surgery. 5.  Patient desires right total mastectomy. Risks, benefits and alternatives of procedure discussed with patient and she agreed to proceed. 6.  She does not desire reconstruction. 7. FU 1 week after surgery Signed:  Selvin Fletcher MD 
[address] [facility] 4/18/2019 10:08 AM

## 2019-05-09 ENCOUNTER — HOSPITAL ENCOUNTER (OUTPATIENT)
Dept: SURGERY | Age: 84
Discharge: HOME | End: 2019-05-09

## 2019-05-09 VITALS — HEIGHT: 64 IN | WEIGHT: 140 LBS | BODY MASS INDEX: 23.9 KG/M2

## 2019-05-09 NOTE — PERIOP NOTES
Patient verified name and . Order for consent found in EHR and matches case posting; patient verifies procedure. Type 1B  surgery, PAT phone assessment complete. Orders received. Labs per surgeon: None. Labs per anesthesia protocol: None. Patient answered medical/surgical history questions at their best of ability. All prior to admission medications documented in [facility]. Patient instructed to take the following medications the day of surgery according to anesthesia guidelines with a small sip of water: Lexapro, Metoprolol, and Oxybutynin. Hold all vitamins 7 days prior to surgery and NSAIDS 5 days prior to surgery. Prescription meds to hold: Meloxicam 5 days prior to surgery and Losartan DOS. Patient instructed on the following:  Arrive at [address], time of arrival to be called the day before by 1700  NPO after midnight including gum, mints, and ice chips  Responsible adult must drive patient to the hospital, stay during surgery, and patient will need supervision 24 hours after anesthesia  Use HIBICLENS in shower the night before surgery and on the morning of surgery  All piercings must be removed prior to arrival.    Leave all valuables (money and jewelry) at home but bring insurance card and ID on       DOS. Do not wear make-up, nail polish, lotions, cologne, perfumes, powders, or oil on skin. Patient teach back successful and patient demonstrates knowledge of instruction.

## 2019-05-14 ENCOUNTER — ANESTHESIA EVENT (OUTPATIENT)
Dept: SURGERY | Age: 84
End: 2019-05-14
Payer: MEDICARE

## 2019-05-15 ENCOUNTER — HOSPITAL ENCOUNTER (OUTPATIENT)
Age: 84
Discharge: HOME | End: 2019-05-16
Attending: SURGERY | Admitting: SURGERY
Payer: MEDICARE

## 2019-05-15 ENCOUNTER — ANESTHESIA (OUTPATIENT)
Dept: SURGERY | Age: 84
End: 2019-05-15
Payer: MEDICARE

## 2019-05-15 DIAGNOSIS — Z17.0 MALIGNANT NEOPLASM OF LOWER-OUTER QUADRANT OF RIGHT BREAST OF FEMALE, ESTROGEN RECEPTOR POSITIVE (HCC): ICD-10-CM

## 2019-05-15 DIAGNOSIS — D05.11 DUCTAL CARCINOMA IN SITU (DCIS) OF RIGHT BREAST: ICD-10-CM

## 2019-05-15 DIAGNOSIS — C50.511 MALIGNANT NEOPLASM OF LOWER-OUTER QUADRANT OF RIGHT BREAST OF FEMALE, ESTROGEN RECEPTOR POSITIVE (HCC): ICD-10-CM

## 2019-05-15 PROBLEM — C50.919 BREAST CANCER (HCC): Status: ACTIVE | Noted: 2019-05-15

## 2019-05-15 LAB
BASOPHILS # BLD: 0 K/UL (ref 0–0.2)
BASOPHILS NFR BLD: 0 % (ref 0–2)
DIFFERENTIAL METHOD BLD: ABNORMAL
EOSINOPHIL # BLD: 0 K/UL (ref 0–0.8)
EOSINOPHIL NFR BLD: 0 % (ref 0.5–7.8)
ERYTHROCYTE [DISTWIDTH] IN BLOOD BY AUTOMATED COUNT: 13 % (ref 11.9–14.6)
HCT VFR BLD AUTO: 40.6 % (ref 35.8–46.3)
HGB BLD-MCNC: 13.1 G/DL (ref 11.7–15.4)
IMM GRANULOCYTES # BLD AUTO: 0.1 K/UL (ref 0–0.5)
IMM GRANULOCYTES NFR BLD AUTO: 1 % (ref 0–5)
LYMPHOCYTES # BLD: 0.3 K/UL (ref 0.5–4.6)
LYMPHOCYTES NFR BLD: 4 % (ref 13–44)
MCH RBC QN AUTO: 30 PG (ref 26.1–32.9)
MCHC RBC AUTO-ENTMCNC: 32.3 G/DL (ref 31.4–35)
MCV RBC AUTO: 93.1 FL (ref 79.6–97.8)
MONOCYTES # BLD: 0.2 K/UL (ref 0.1–1.3)
MONOCYTES NFR BLD: 2 % (ref 4–12)
NEUTS SEG # BLD: 8.1 K/UL (ref 1.7–8.2)
NEUTS SEG NFR BLD: 94 % (ref 43–78)
NRBC # BLD: 0 K/UL (ref 0–0.2)
PLATELET # BLD AUTO: 192 K/UL (ref 150–450)
PMV BLD AUTO: 10 FL (ref 9.4–12.3)
RBC # BLD AUTO: 4.36 M/UL (ref 4.05–5.2)
WBC # BLD AUTO: 8.6 K/UL (ref 4.3–11.1)

## 2019-05-15 PROCEDURE — 77030002916 HC SUT ETHLN J&J -A: Performed by: SURGERY

## 2019-05-15 PROCEDURE — 77030013567 HC DRN WND RESERV BARD -A: Performed by: SURGERY

## 2019-05-15 PROCEDURE — 74011250636 HC RX REV CODE- 250/636: Performed by: SURGERY

## 2019-05-15 PROCEDURE — 76210000006 HC OR PH I REC 0.5 TO 1 HR: Performed by: SURGERY

## 2019-05-15 PROCEDURE — 77030002933 HC SUT MCRYL J&J -A: Performed by: SURGERY

## 2019-05-15 PROCEDURE — 77010033678 HC OXYGEN DAILY

## 2019-05-15 PROCEDURE — 77030037088 HC TUBE ENDOTRACH ORAL NSL COVD-A: Performed by: ANESTHESIOLOGY

## 2019-05-15 PROCEDURE — 74011250636 HC RX REV CODE- 250/636: Performed by: ANESTHESIOLOGY

## 2019-05-15 PROCEDURE — 74011250636 HC RX REV CODE- 250/636

## 2019-05-15 PROCEDURE — 74011000250 HC RX REV CODE- 250: Performed by: SURGERY

## 2019-05-15 PROCEDURE — 76010000162 HC OR TIME 1.5 TO 2 HR INTENSV-TIER 1: Performed by: SURGERY

## 2019-05-15 PROCEDURE — 36415 COLL VENOUS BLD VENIPUNCTURE: CPT

## 2019-05-15 PROCEDURE — 74011000250 HC RX REV CODE- 250

## 2019-05-15 PROCEDURE — 77030010514 HC APPL CLP LIG COVD -B: Performed by: SURGERY

## 2019-05-15 PROCEDURE — 77030039425 HC BLD LARYNG TRULITE DISP TELE -A: Performed by: ANESTHESIOLOGY

## 2019-05-15 PROCEDURE — 74011250637 HC RX REV CODE- 250/637: Performed by: SURGERY

## 2019-05-15 PROCEDURE — 77030018836 HC SOL IRR NACL ICUM -A: Performed by: SURGERY

## 2019-05-15 PROCEDURE — 76060000035 HC ANESTHESIA 2 TO 2.5 HR: Performed by: SURGERY

## 2019-05-15 PROCEDURE — 77030032490 HC SLV COMPR SCD KNE COVD -B: Performed by: SURGERY

## 2019-05-15 PROCEDURE — 19303 MAST SIMPLE COMPLETE: CPT | Performed by: SURGERY

## 2019-05-15 PROCEDURE — 77030011239 HC DRN WND FLAT CARD -A: Performed by: SURGERY

## 2019-05-15 PROCEDURE — 77030003029 HC SUT VCRL J&J -B: Performed by: SURGERY

## 2019-05-15 PROCEDURE — 77030018719 HC DRSG PTCH ANTIMIC J&J -A: Performed by: SURGERY

## 2019-05-15 PROCEDURE — 85025 COMPLETE CBC W/AUTO DIFF WBC: CPT

## 2019-05-15 PROCEDURE — 94760 N-INVAS EAR/PLS OXIMETRY 1: CPT

## 2019-05-15 PROCEDURE — 77030002996 HC SUT SLK J&J -A: Performed by: SURGERY

## 2019-05-15 PROCEDURE — 77030020782 HC GWN BAIR PAWS FLX 3M -B: Performed by: ANESTHESIOLOGY

## 2019-05-15 PROCEDURE — 77030011267 HC ELECTRD BLD COVD -A: Performed by: SURGERY

## 2019-05-15 PROCEDURE — 88307 TISSUE EXAM BY PATHOLOGIST: CPT

## 2019-05-15 RX ORDER — SODIUM CHLORIDE 0.9 % (FLUSH) 0.9 %
5-40 SYRINGE (ML) INJECTION AS NEEDED
Status: DISCONTINUED | OUTPATIENT
Start: 2019-05-15 | End: 2019-05-15 | Stop reason: HOSPADM

## 2019-05-15 RX ORDER — CEFAZOLIN SODIUM/WATER 2 G/20 ML
2 SYRINGE (ML) INTRAVENOUS ONCE
Status: COMPLETED | OUTPATIENT
Start: 2019-05-15 | End: 2019-05-15

## 2019-05-15 RX ORDER — EPHEDRINE SULFATE 50 MG/ML
INJECTION, SOLUTION INTRAVENOUS AS NEEDED
Status: DISCONTINUED | OUTPATIENT
Start: 2019-05-15 | End: 2019-05-15 | Stop reason: HOSPADM

## 2019-05-15 RX ORDER — LOSARTAN POTASSIUM 50 MG/1
50 TABLET ORAL DAILY
Status: DISCONTINUED | OUTPATIENT
Start: 2019-05-16 | End: 2019-05-16 | Stop reason: HOSPADM

## 2019-05-15 RX ORDER — SODIUM CHLORIDE 0.9 % (FLUSH) 0.9 %
5-40 SYRINGE (ML) INJECTION AS NEEDED
Status: DISCONTINUED | OUTPATIENT
Start: 2019-05-15 | End: 2019-05-16 | Stop reason: HOSPADM

## 2019-05-15 RX ORDER — HYDROCODONE BITARTRATE AND ACETAMINOPHEN 5; 325 MG/1; MG/1
2 TABLET ORAL AS NEEDED
Status: DISCONTINUED | OUTPATIENT
Start: 2019-05-15 | End: 2019-05-15 | Stop reason: HOSPADM

## 2019-05-15 RX ORDER — DEXAMETHASONE SODIUM PHOSPHATE 4 MG/ML
INJECTION, SOLUTION INTRA-ARTICULAR; INTRALESIONAL; INTRAMUSCULAR; INTRAVENOUS; SOFT TISSUE AS NEEDED
Status: DISCONTINUED | OUTPATIENT
Start: 2019-05-15 | End: 2019-05-15 | Stop reason: HOSPADM

## 2019-05-15 RX ORDER — LIDOCAINE HYDROCHLORIDE 20 MG/ML
INJECTION, SOLUTION EPIDURAL; INFILTRATION; INTRACAUDAL; PERINEURAL AS NEEDED
Status: DISCONTINUED | OUTPATIENT
Start: 2019-05-15 | End: 2019-05-15 | Stop reason: HOSPADM

## 2019-05-15 RX ORDER — MORPHINE SULFATE 2 MG/ML
2 INJECTION, SOLUTION INTRAMUSCULAR; INTRAVENOUS
Status: DISCONTINUED | OUTPATIENT
Start: 2019-05-15 | End: 2019-05-16 | Stop reason: HOSPADM

## 2019-05-15 RX ORDER — OXYBUTYNIN CHLORIDE 5 MG/1
5 TABLET ORAL 3 TIMES DAILY
Status: DISCONTINUED | OUTPATIENT
Start: 2019-05-16 | End: 2019-05-16 | Stop reason: HOSPADM

## 2019-05-15 RX ORDER — ROCURONIUM BROMIDE 10 MG/ML
INJECTION, SOLUTION INTRAVENOUS AS NEEDED
Status: DISCONTINUED | OUTPATIENT
Start: 2019-05-15 | End: 2019-05-15 | Stop reason: HOSPADM

## 2019-05-15 RX ORDER — ACETAMINOPHEN 325 MG/1
650 TABLET ORAL
Status: DISCONTINUED | OUTPATIENT
Start: 2019-05-15 | End: 2019-05-16 | Stop reason: HOSPADM

## 2019-05-15 RX ORDER — ONDANSETRON 2 MG/ML
INJECTION INTRAMUSCULAR; INTRAVENOUS AS NEEDED
Status: DISCONTINUED | OUTPATIENT
Start: 2019-05-15 | End: 2019-05-15 | Stop reason: HOSPADM

## 2019-05-15 RX ORDER — MIDAZOLAM HYDROCHLORIDE 1 MG/ML
2 INJECTION, SOLUTION INTRAMUSCULAR; INTRAVENOUS
Status: DISCONTINUED | OUTPATIENT
Start: 2019-05-15 | End: 2019-05-15 | Stop reason: HOSPADM

## 2019-05-15 RX ORDER — SODIUM CHLORIDE 9 MG/ML
25 INJECTION, SOLUTION INTRAVENOUS AS NEEDED
Status: DISCONTINUED | OUTPATIENT
Start: 2019-05-15 | End: 2019-05-15 | Stop reason: HOSPADM

## 2019-05-15 RX ORDER — BUPIVACAINE HYDROCHLORIDE 5 MG/ML
INJECTION, SOLUTION EPIDURAL; INTRACAUDAL AS NEEDED
Status: DISCONTINUED | OUTPATIENT
Start: 2019-05-15 | End: 2019-05-15 | Stop reason: HOSPADM

## 2019-05-15 RX ORDER — OXYCODONE AND ACETAMINOPHEN 5; 325 MG/1; MG/1
1 TABLET ORAL
Qty: 30 TAB | Refills: 0 | Status: SHIPPED | OUTPATIENT
Start: 2019-05-15 | End: 2019-05-22

## 2019-05-15 RX ORDER — FENTANYL CITRATE 50 UG/ML
INJECTION, SOLUTION INTRAMUSCULAR; INTRAVENOUS AS NEEDED
Status: DISCONTINUED | OUTPATIENT
Start: 2019-05-15 | End: 2019-05-15 | Stop reason: HOSPADM

## 2019-05-15 RX ORDER — PANTOPRAZOLE SODIUM 40 MG/1
40 TABLET, DELAYED RELEASE ORAL
Status: DISCONTINUED | OUTPATIENT
Start: 2019-05-15 | End: 2019-05-16 | Stop reason: HOSPADM

## 2019-05-15 RX ORDER — DOCUSATE SODIUM 100 MG/1
100 CAPSULE, LIQUID FILLED ORAL 2 TIMES DAILY
Status: DISCONTINUED | OUTPATIENT
Start: 2019-05-15 | End: 2019-05-16 | Stop reason: HOSPADM

## 2019-05-15 RX ORDER — OXYCODONE HYDROCHLORIDE 5 MG/1
5 TABLET ORAL
Status: DISCONTINUED | OUTPATIENT
Start: 2019-05-15 | End: 2019-05-15 | Stop reason: HOSPADM

## 2019-05-15 RX ORDER — SODIUM CHLORIDE, SODIUM LACTATE, POTASSIUM CHLORIDE, CALCIUM CHLORIDE 600; 310; 30; 20 MG/100ML; MG/100ML; MG/100ML; MG/100ML
75 INJECTION, SOLUTION INTRAVENOUS CONTINUOUS
Status: DISCONTINUED | OUTPATIENT
Start: 2019-05-15 | End: 2019-05-15 | Stop reason: HOSPADM

## 2019-05-15 RX ORDER — LIDOCAINE HYDROCHLORIDE 10 MG/ML
0.1 INJECTION INFILTRATION; PERINEURAL AS NEEDED
Status: DISCONTINUED | OUTPATIENT
Start: 2019-05-15 | End: 2019-05-15 | Stop reason: HOSPADM

## 2019-05-15 RX ORDER — FENTANYL CITRATE 50 UG/ML
100 INJECTION, SOLUTION INTRAMUSCULAR; INTRAVENOUS ONCE
Status: DISCONTINUED | OUTPATIENT
Start: 2019-05-15 | End: 2019-05-15 | Stop reason: HOSPADM

## 2019-05-15 RX ORDER — HYDROMORPHONE HYDROCHLORIDE 2 MG/ML
0.5 INJECTION, SOLUTION INTRAMUSCULAR; INTRAVENOUS; SUBCUTANEOUS
Status: DISCONTINUED | OUTPATIENT
Start: 2019-05-15 | End: 2019-05-15 | Stop reason: HOSPADM

## 2019-05-15 RX ORDER — PROPOFOL 10 MG/ML
INJECTION, EMULSION INTRAVENOUS AS NEEDED
Status: DISCONTINUED | OUTPATIENT
Start: 2019-05-15 | End: 2019-05-15 | Stop reason: HOSPADM

## 2019-05-15 RX ORDER — SODIUM CHLORIDE 0.9 % (FLUSH) 0.9 %
5-40 SYRINGE (ML) INJECTION EVERY 8 HOURS
Status: DISCONTINUED | OUTPATIENT
Start: 2019-05-15 | End: 2019-05-15 | Stop reason: HOSPADM

## 2019-05-15 RX ORDER — METHOCARBAMOL 500 MG/1
750 TABLET, FILM COATED ORAL
Status: DISCONTINUED | OUTPATIENT
Start: 2019-05-15 | End: 2019-05-16 | Stop reason: HOSPADM

## 2019-05-15 RX ORDER — NEOSTIGMINE METHYLSULFATE 1 MG/ML
INJECTION INTRAVENOUS AS NEEDED
Status: DISCONTINUED | OUTPATIENT
Start: 2019-05-15 | End: 2019-05-15 | Stop reason: HOSPADM

## 2019-05-15 RX ORDER — GLYCOPYRROLATE 0.2 MG/ML
INJECTION INTRAMUSCULAR; INTRAVENOUS AS NEEDED
Status: DISCONTINUED | OUTPATIENT
Start: 2019-05-15 | End: 2019-05-15 | Stop reason: HOSPADM

## 2019-05-15 RX ORDER — OXYCODONE AND ACETAMINOPHEN 5; 325 MG/1; MG/1
1 TABLET ORAL
Status: DISCONTINUED | OUTPATIENT
Start: 2019-05-15 | End: 2019-05-16 | Stop reason: HOSPADM

## 2019-05-15 RX ORDER — SODIUM CHLORIDE 0.9 % (FLUSH) 0.9 %
5-40 SYRINGE (ML) INJECTION EVERY 8 HOURS
Status: DISCONTINUED | OUTPATIENT
Start: 2019-05-15 | End: 2019-05-16 | Stop reason: HOSPADM

## 2019-05-15 RX ORDER — METOPROLOL SUCCINATE 25 MG/1
25 TABLET, EXTENDED RELEASE ORAL DAILY
Status: DISCONTINUED | OUTPATIENT
Start: 2019-05-16 | End: 2019-05-16 | Stop reason: HOSPADM

## 2019-05-15 RX ORDER — ONDANSETRON 2 MG/ML
4 INJECTION INTRAMUSCULAR; INTRAVENOUS
Status: DISCONTINUED | OUTPATIENT
Start: 2019-05-15 | End: 2019-05-16 | Stop reason: HOSPADM

## 2019-05-15 RX ADMIN — LIDOCAINE HYDROCHLORIDE 0.1 ML: 10 INJECTION, SOLUTION INFILTRATION; PERINEURAL at 09:36

## 2019-05-15 RX ADMIN — PROPOFOL 150 MG: 10 INJECTION, EMULSION INTRAVENOUS at 10:29

## 2019-05-15 RX ADMIN — ACETAMINOPHEN 650 MG: 325 TABLET, FILM COATED ORAL at 22:12

## 2019-05-15 RX ADMIN — HYDROMORPHONE HYDROCHLORIDE 0.5 MG: 2 INJECTION INTRAMUSCULAR; INTRAVENOUS; SUBCUTANEOUS at 12:35

## 2019-05-15 RX ADMIN — FENTANYL CITRATE 50 MCG: 50 INJECTION, SOLUTION INTRAMUSCULAR; INTRAVENOUS at 10:47

## 2019-05-15 RX ADMIN — NEOSTIGMINE METHYLSULFATE 1 MG: 1 INJECTION INTRAVENOUS at 12:04

## 2019-05-15 RX ADMIN — OXYBUTYNIN CHLORIDE 5 MG: 5 TABLET ORAL at 23:48

## 2019-05-15 RX ADMIN — ROCURONIUM BROMIDE 30 MG: 10 INJECTION, SOLUTION INTRAVENOUS at 10:29

## 2019-05-15 RX ADMIN — FENTANYL CITRATE 50 MCG: 50 INJECTION, SOLUTION INTRAMUSCULAR; INTRAVENOUS at 11:23

## 2019-05-15 RX ADMIN — GLYCOPYRROLATE 0.2 MG: 0.2 INJECTION INTRAMUSCULAR; INTRAVENOUS at 11:52

## 2019-05-15 RX ADMIN — FENTANYL CITRATE 100 MCG: 50 INJECTION, SOLUTION INTRAMUSCULAR; INTRAVENOUS at 10:29

## 2019-05-15 RX ADMIN — EPHEDRINE SULFATE 10 MG: 50 INJECTION, SOLUTION INTRAVENOUS at 11:33

## 2019-05-15 RX ADMIN — DOCUSATE SODIUM 100 MG: 100 CAPSULE, LIQUID FILLED ORAL at 22:12

## 2019-05-15 RX ADMIN — EPHEDRINE SULFATE 10 MG: 50 INJECTION, SOLUTION INTRAVENOUS at 10:39

## 2019-05-15 RX ADMIN — Medication 10 ML: at 23:48

## 2019-05-15 RX ADMIN — DEXAMETHASONE SODIUM PHOSPHATE 4 MG: 4 INJECTION, SOLUTION INTRA-ARTICULAR; INTRALESIONAL; INTRAMUSCULAR; INTRAVENOUS; SOFT TISSUE at 10:41

## 2019-05-15 RX ADMIN — PANTOPRAZOLE SODIUM 40 MG: 40 TABLET, DELAYED RELEASE ORAL at 23:20

## 2019-05-15 RX ADMIN — ONDANSETRON 4 MG: 2 INJECTION INTRAMUSCULAR; INTRAVENOUS at 10:41

## 2019-05-15 RX ADMIN — FENTANYL CITRATE 50 MCG: 50 INJECTION, SOLUTION INTRAMUSCULAR; INTRAVENOUS at 11:06

## 2019-05-15 RX ADMIN — NEOSTIGMINE METHYLSULFATE 1 MG: 1 INJECTION INTRAVENOUS at 11:52

## 2019-05-15 RX ADMIN — EPHEDRINE SULFATE 10 MG: 50 INJECTION, SOLUTION INTRAVENOUS at 10:37

## 2019-05-15 RX ADMIN — Medication 10 ML: at 14:51

## 2019-05-15 RX ADMIN — EPHEDRINE SULFATE 10 MG: 50 INJECTION, SOLUTION INTRAVENOUS at 11:32

## 2019-05-15 RX ADMIN — SODIUM CHLORIDE, SODIUM LACTATE, POTASSIUM CHLORIDE, AND CALCIUM CHLORIDE 75 ML/HR: 600; 310; 30; 20 INJECTION, SOLUTION INTRAVENOUS at 09:30

## 2019-05-15 RX ADMIN — Medication 2 G: at 10:39

## 2019-05-15 RX ADMIN — GLYCOPYRROLATE 0.2 MG: 0.2 INJECTION INTRAMUSCULAR; INTRAVENOUS at 12:04

## 2019-05-15 RX ADMIN — HYDROMORPHONE HYDROCHLORIDE 0.5 MG: 2 INJECTION INTRAMUSCULAR; INTRAVENOUS; SUBCUTANEOUS at 12:40

## 2019-05-15 RX ADMIN — LIDOCAINE HYDROCHLORIDE 60 MG: 20 INJECTION, SOLUTION EPIDURAL; INFILTRATION; INTRACAUDAL; PERINEURAL at 10:29

## 2019-05-15 RX ADMIN — ROCURONIUM BROMIDE 7 MG: 10 INJECTION, SOLUTION INTRAVENOUS at 11:06

## 2019-05-15 RX ADMIN — DOCUSATE SODIUM 100 MG: 100 CAPSULE, LIQUID FILLED ORAL at 17:15

## 2019-05-15 RX ADMIN — SODIUM CHLORIDE, SODIUM LACTATE, POTASSIUM CHLORIDE, AND CALCIUM CHLORIDE: 600; 310; 30; 20 INJECTION, SOLUTION INTRAVENOUS at 10:23

## 2019-05-15 NOTE — BRIEF OP NOTE
BRIEF OPERATIVE NOTE Date of Procedure: 5/15/2019 Preoperative Diagnosis: Malignant neoplasm of overlapping sites of right female breast, unspecified estrogen receptor status ([facility] [address] Edmundo Abide Postoperative Diagnosis: Malignant neoplasm of overlapping sites of right female breast, unspecified estrogen receptor status ([facility] [address] Edmundo Abide Procedure(s): RIGHT TOTAL BREAST MASTECTOMY Surgeon(s) and Role: Sonia Hilario MD - Primary Surgical Assistant: None Surgical Staff: 
Circ-1: Brandon Houser RN 
Circ-Relief: Cole Kenyon RN Scrub Tech-1: Francois Gonzalez Scrub Tech-2: Carrillo Pedroza Event Time In Time Out Incision Start 0317 [id number] Incision Close 1204 Anesthesia: General  
Estimated Blood Loss: Minimal 
Specimens:  
ID Type Source Tests Collected by Time Destination 1 : right total mastectomy Fresh   Sonia Hilario MD 5/15/2019 1122 Pathology Findings: right total mastectomy. Oblique incision closed in two layers. 10 Lithuanian flat drain left in place. Complications: None Implants: * No implants in log *

## 2019-05-15 NOTE — PROGRESS NOTES
05/15/19 1334 Peripheral Vascular Peripheral Vascular (WDL) WDL Dual Skin Pressure Injury Assessment Dual Skin Pressure Injury Assessment X Second Care Provider (Based on [address] [place] [address]) Feli Skin Integumentary Skin Integumentary (WDL) X Pressure  Injury Documentation No Pressure Injury Noted-Pressure Ulcer Prevention Initiated Skin Condition/Temp Warm;Dry Skin Color Appropriate for ethnicity Skin Integrity Incision (comment) Turgor Epidermis thin w/ loss of subcut tissue Wound Prevention and Protection Methods Orientation of Wound Prevention Posterior Location of Wound Prevention Sacrum/Coccyx Dressing Present  No  
Wound Offloading (Prevention Methods) Bed, pressure reduction mattress;Repositioning;Pillows Braydon Scale (11years of age and older) Sensory Perception 4 Moisture 4 Activity 4 Mobility 4 Nutrition 4 Friction and Shear 3 Braydon Score 23 Primary Nurse Nasima Paris RN and KRISTINE Edmond performed a dual skin assessment on this patient Impairment noted- see wound doc flow sheet Braydon score is 23

## 2019-05-15 NOTE — OP NOTES
Operative Report    Name: Fran Joel      MRN: [id number]       : 1935       Age: 82 y.o. Sex: female    Date of Surgery: 5/15/2019     Preoperative Diagnosis: Malignant neoplasm of overlapping sites of right female breast, unspecified estrogen receptor status ([facility] [address] [C50.811]     Postoperative Diagnosis: Malignant neoplasm of overlapping sites of right female breast, unspecified estrogen receptor status ([facility] [address] R Tootie 11     Name of procedure: RIGHT TOTAL BREAST MASTECTOMY:     Surgeon: Surgeon(s) and Role:     * Deepika Lucia MD - Primary    Anesthesia: General     Complications: None    Estimated Blood Loss: less than 25 ml           Specimens:   ID Type Source Tests Collected by Time Destination   1 : right total mastectomy Fresh   Deepika Lucia MD 5/15/2019 1122 Pathology       Statement of Medical Necessity: Fran Joel is a 82 y.o. female who was diagnosed with right breast cancer confirmed by biopsy presented to our clinic to discuss surgical options. She underwent breast conservation surgery and was found to have positive margins for DCIS. She underwent addition margins resection and they were still positive for DCIS. She was recommended a total right simple mastectomy and she desire resection. Alternative of treatment were discussed with her and she opted for Right total mastectomy. We discussed risks, benefits and alternatives of surgery. Patient understood and agreed to proceed. Procedure Details   After informed consent was obtained, patient was brought to the operating room and placed on the table in a supine position with adequate padding to all pressure points and the arm extended laterally. Patient received preoperative antibiotics. The Right breast and arm were prepped and draped in standard sterile fashion. A timeout was performed with all team members present.     An oblique elliptical incision was marked to include the nipple and her previous surgical scar and was made with a knife. We used the cautery to elevate superior and inferior skin flaps, attempting to maintain the avascular superficial subcutaneous plane. Margins of dissection were the clavicle, costosternal junction and rectus fascia. The breast was then amputated off of the chest wall, to include the pectoralis fascia, and laterally it was transected off of the skin and lateral margin of the pectoralis major and it was removed. The wound was irrigated and hemostasis was achieved with cautery. The breast was marked with short suture superiorly and long suture laterally. A 10 Hungarian flat drain was placed and secured with 2-0 nylon suture. The dermis was approximated with interrupted 3-0 Vicryl sutures. The skin was closed with 4-0 Monocryl in a running subcuticular fashion. The skin was cleaned and dried. Mastisol and steri strips were placed. 4x4 gauzes were placed over the right chest and the chest was wrapped with an ACE wrap. The patient tolerated the procedure well. There were no immediate complications. All counts were reported as correct.       Signed by: Lizbeth Irwin MD  [address] [facility]  5/15/2019 12:37 PM

## 2019-05-15 NOTE — ANESTHESIA POSTPROCEDURE EVALUATION
Procedure(s): RIGHT TOTAL BREAST MASTECTOMY. general 
 
Anesthesia Post Evaluation Multimodal analgesia: multimodal analgesia used between 6 hours prior to anesthesia start to PACU discharge Patient location during evaluation: bedside Patient participation: complete - patient participated Level of consciousness: awake and alert Pain management: adequate Airway patency: patent Anesthetic complications: no 
Cardiovascular status: hemodynamically stable Respiratory status: spontaneous ventilation Hydration status: euvolemic Comments: Patient stable and may discharge at this time. Vitals Value Taken Time /61 5/15/2019  1:17 PM  
Temp 36.7 °C (98 °F) 5/15/2019 12:25 PM  
Pulse 73 5/15/2019  1:17 PM  
Resp 16 5/15/2019  1:17 PM  
SpO2 95 % 5/15/2019  1:17 PM

## 2019-05-15 NOTE — PROGRESS NOTES
Care Management Interventions PCP Verified by CM: Yes Mode of Transport at Discharge: Other (see comment) Transition of Care Consult (CM Consult): Other Current Support Network: Own Home Confirm Follow Up Transport: Family Plan discussed with Pt/Family/Caregiver: Yes Freedom of Choice Offered: Yes Discharge Location Discharge Placement: Home Chart screened by  for discharge planning. No needs identified at this time. Please consult  if any new issues arise.

## 2019-05-15 NOTE — INTERVAL H&P NOTE
H&P Update: 
Gerardo Lang was seen and examined. History and physical has been reviewed. The patient has been examined.  There have been no significant clinical changes since the completion of the originally dated History and Physical.

## 2019-05-15 NOTE — PERIOP NOTES
TRANSFER - OUT REPORT: 
 
Verbal report given to KRISTINE Schroeder on [employer]  being transferred to [facility] surge room 359 for routine post - op Report consisted of patients Situation, Background, Assessment and  
Recommendations(SBAR). Information from the following report(s) SBAR, Procedure Summary, Intake/Output and MAR was reviewed with the receiving nurse. Lines:    
 
Opportunity for questions and clarification was provided. Patient transported with: 
 O2 @ 3 liters

## 2019-05-15 NOTE — PROGRESS NOTES
TRANSFER - IN REPORT: 
 
Verbal report received from Health system (name) on [facility]  being received from PACU (unit) for routine progression of care Report consisted of patients Situation, Background, Assessment and  
Recommendations(SBAR). Information from the following report(s) SBAR was reviewed with the receiving nurse. Opportunity for questions and clarification was provided. Assessment completed upon patients arrival to unit and care assumed.

## 2019-05-16 VITALS
HEART RATE: 65 BPM | WEIGHT: 140 LBS | TEMPERATURE: 97.8 F | BODY MASS INDEX: 24.03 KG/M2 | DIASTOLIC BLOOD PRESSURE: 77 MMHG | SYSTOLIC BLOOD PRESSURE: 127 MMHG | RESPIRATION RATE: 16 BRPM | OXYGEN SATURATION: 96 %

## 2019-05-16 LAB
ANION GAP SERPL CALC-SCNC: 6 MMOL/L (ref 7–16)
BUN SERPL-MCNC: 15 MG/DL (ref 8–23)
CALCIUM SERPL-MCNC: 9.4 MG/DL (ref 8.3–10.4)
CHLORIDE SERPL-SCNC: 107 MMOL/L (ref 98–107)
CO2 SERPL-SCNC: 29 MMOL/L (ref 21–32)
CREAT SERPL-MCNC: 0.79 MG/DL (ref 0.6–1)
GLUCOSE SERPL-MCNC: 94 MG/DL (ref 65–100)
POTASSIUM SERPL-SCNC: 3.9 MMOL/L (ref 3.5–5.1)
SODIUM SERPL-SCNC: 142 MMOL/L (ref 136–145)

## 2019-05-16 PROCEDURE — 74011250637 HC RX REV CODE- 250/637: Performed by: SURGERY

## 2019-05-16 PROCEDURE — 80048 BASIC METABOLIC PNL TOTAL CA: CPT

## 2019-05-16 PROCEDURE — 36415 COLL VENOUS BLD VENIPUNCTURE: CPT

## 2019-05-16 RX ADMIN — OXYBUTYNIN CHLORIDE 5 MG: 5 TABLET ORAL at 09:53

## 2019-05-16 RX ADMIN — METOPROLOL SUCCINATE 25 MG: 25 TABLET, EXTENDED RELEASE ORAL at 09:54

## 2019-05-16 RX ADMIN — DOCUSATE SODIUM 100 MG: 100 CAPSULE, LIQUID FILLED ORAL at 09:53

## 2019-05-16 RX ADMIN — LOSARTAN POTASSIUM 50 MG: 50 TABLET ORAL at 09:53

## 2019-05-16 RX ADMIN — ACETAMINOPHEN 650 MG: 325 TABLET, FILM COATED ORAL at 09:53

## 2019-05-16 RX ADMIN — Medication 10 ML: at 06:14

## 2019-05-16 NOTE — PROGRESS NOTES
Discharge instructions reviewed with patient. Opportunity for questions given. CATRACHO drain education sheet and record sheet given and reviewed with patient. Rx given for Percocet. Pt would like to shower before leaving.

## 2019-05-16 NOTE — DISCHARGE INSTRUCTIONS
* Follow-up Care/Patient Instructions: Activity: Activity as tolerated and No driving while on analgesics  Diet: Regular Diet  Wound Care: Keep wound clean and dry and As directed      DISCHARGE SUMMARY from Nurse    PATIENT INSTRUCTIONS:    After general anesthesia or intravenous sedation, for 24 hours or while taking prescription Narcotics:  · Limit your activities  · Do not drive and operate hazardous machinery  · Do not make important personal or business decisions  · Do  not drink alcoholic beverages  · If you have not urinated within 8 hours after discharge, please contact your surgeon on call. Report the following to your surgeon:  · Excessive pain, swelling, redness or odor of or around the surgical area  · Temperature over 100.5  · Nausea and vomiting lasting longer than 4 hours or if unable to take medications  · Any signs of decreased circulation or nerve impairment to extremity: change in color, persistent  numbness, tingling, coldness or increase pain  · Any questions    What to do at Home:    *  Please give a list of your current medications to your Primary Care Provider. *  Please update this list whenever your medications are discontinued, doses are      changed, or new medications (including over-the-counter products) are added. *  Please carry medication information at all times in case of emergency situations. These are general instructions for a healthy lifestyle:    No smoking/ No tobacco products/ Avoid exposure to second hand smoke  Surgeon General's Warning:  Quitting smoking now greatly reduces serious risk to your health.     Obesity, smoking, and sedentary lifestyle greatly increases your risk for illness    A healthy diet, regular physical exercise & weight monitoring are important for maintaining a healthy lifestyle    You may be retaining fluid if you have a history of heart failure or if you experience any of the following symptoms:  Weight gain of 3 pounds or more overnight or 5 pounds in a week, increased swelling in our hands or feet or shortness of breath while lying flat in bed. Please call your doctor as soon as you notice any of these symptoms; do not wait until your next office visit. Recognize signs and symptoms of STROKE:    F-face looks uneven    A-arms unable to move or move unevenly    S-speech slurred or non-existent    T-time-call 911 as soon as signs and symptoms begin-DO NOT go       Back to bed or wait to see if you get better-TIME IS BRAIN. Warning Signs of HEART ATTACK     Call 911 if you have these symptoms:   Chest discomfort. Most heart attacks involve discomfort in the center of the chest that lasts more than a few minutes, or that goes away and comes back. It can feel like uncomfortable pressure, squeezing, fullness, or pain.  Discomfort in other areas of the upper body. Symptoms can include pain or discomfort in one or both arms, the back, neck, jaw, or stomach.  Shortness of breath with or without chest discomfort.  Other signs may include breaking out in a cold sweat, nausea, or lightheadedness. Don't wait more than five minutes to call 911 - MINUTES MATTER! Fast action can save your life. Calling 911 is almost always the fastest way to get lifesaving treatment. Emergency Medical Services staff can begin treatment when they arrive -- up to an hour sooner than if someone gets to the hospital by car. The discharge information has been reviewed with the patient. The patient verbalized understanding. Discharge medications reviewed with the patient and appropriate educational materials and side effects teaching were provided. ___________________________________________________________________________________________________________________________________  No bathtub or pool for 2 weeks. Ok to shower. Use compression wrap for support for the next 3-4 weeks. Leave the Steri-strips intact, they will fall off on their own.    Take tylenol or ibuprofen for as needed for mild pain every 4-6 hours. Percocet prescribed for mod to severe pain. This is a narcotic and can cause drowsiness, nausea and vomiting and constipation. Take Colace 100mg BID (over the counter) if constipated. CATRACHO drain in place. Record output daily. Strip tubing as necessary.

## 2019-05-16 NOTE — PROGRESS NOTES
Report received from [facility]. PM assessment completed. PT is AAO x 4 with normal unlabored respirations present on RA. PT denies pain or other needs at this time. Bed is low and locked with call light in reach, will continue to monitor.

## 2019-05-16 NOTE — PROGRESS NOTES
Sitting in recliner, no acute distress,  at bed, lungs clear A/P, chest ace wrap dressing dry and intact, J.P. drain sanguinous drainage, charged, neurovascular checks WDL.

## 2019-05-16 NOTE — PROGRESS NOTES
Pt stating she would like heart burn medicine and to resume her Oxybutynin 5mg PO. Notified Dr Logan Puri MD stated to order protonix 40mg po prn and the Oxybutynin 5mg po.

## 2019-05-16 NOTE — PROGRESS NOTES
Post op interview conducted following RIGHT TOTAL BREAST MASTECTOMY:  dated 5/15/19, no anesthetic complications noted

## 2019-05-16 NOTE — DISCHARGE SUMMARY
Physician Discharge Summary     Patient ID:  Misael Figueredo  [id number]  45 y.o.  1935    Allergies: Patient has no known allergies. Admit Date: 5/15/2019    Discharge Date: 5/16/2019    * Admission Diagnoses: Malignant neoplasm of overlapping sites of right female breast, unspecified estrogen receptor status ([facility] Ronda Yañez; Breast cancer ([facility] Keagan Dawkins; Breast cancer ([facility] [C50.919]    * Discharge Diagnoses:    Hospital Problems as of 5/16/2019 Date Reviewed: 3/21/2019          Codes Class Noted - Resolved POA    Breast cancer ([facility] ICD-10-CM: C50.919  ICD-9-CM: 174.9  5/15/2019 - Present Unknown               Admission Condition: Fair    * Discharge Condition: improved    * Procedures: Procedure(s):  RIGHT TOTAL BREAST MASTECTOMY    * Hospital Course:   Normal hospital course for this procedure. Consults: None    Significant Diagnostic Studies: labs:   Lab Results   Component Value Date/Time    WBC 8.6 05/15/2019 04:21 PM    HGB 13.1 05/15/2019 04:21 PM    HCT 40.6 05/15/2019 04:21 PM    PLATELET 373 70/77/6423 04:21 PM    MCV 93.1 05/15/2019 04:21 PM     Lab Results   Component Value Date/Time    Sodium 142 05/16/2019 05:54 AM    Potassium 3.9 05/16/2019 05:54 AM    Chloride 107 05/16/2019 05:54 AM    CO2 29 05/16/2019 05:54 AM    Anion gap 6 (L) 05/16/2019 05:54 AM    Glucose 94 05/16/2019 05:54 AM    BUN 15 05/16/2019 05:54 AM    Creatinine 0.79 05/16/2019 05:54 AM    GFR est AA >60 05/16/2019 05:54 AM    GFR est non-AA >60 05/16/2019 05:54 AM    Calcium 9.4 05/16/2019 05:54 AM         * Disposition: Home    Discharge Medications:   Current Discharge Medication List      START taking these medications    Details   oxyCODONE-acetaminophen (PERCOCET) 5-325 mg per tablet Take 1 Tab by mouth every six (6) hours as needed for Pain for up to 7 days. Max Daily Amount: 4 Tabs.   Qty: 30 Tab, Refills: 0    Associated Diagnoses: Malignant neoplasm of lower-outer quadrant of right breast of female, estrogen receptor positive ([address]         CONTINUE these medications which have NOT CHANGED    Details   losartan (COZAAR) 50 mg tablet Take  by mouth daily. OXYBUTYNIN CHLORIDE PO Take  by mouth two (2) times a day. 1/2 pill three times a day       multivitamin (ONE A DAY) tablet Take 1 Tab by mouth daily. cholecalciferol (VITAMIN D3) 1,000 unit cap Take 1,000 Units by mouth daily. magnesium oxide (MAG-OX) 400 mg tablet Take 400 mg by mouth nightly. glucosam-chondro-herb 149-hyal (GLUCOS CHOND CPLX ADVANCED PO) Take 1 Tab by mouth daily. polyethylene glycol (MIRALAX) 17 gram/dose powder Take 17 g by mouth daily. B.infantis-B.ani-B.long-B.bifi (PROBIOTIC 4X) 10-15 mg TbEC Take 1 Tab by mouth daily. ascorbic acid, vitamin C, (VITAMIN C) 500 mg tablet Take 500 mg by mouth daily. cyanocobalamin (VITAMIN B-12) 100 mcg tablet Take 100 mcg by mouth daily. escitalopram oxalate (LEXAPRO) 5 mg tablet Take 5 mg by mouth daily. Refills: 5      meloxicam (MOBIC) 15 mg tablet Take 15 mg by mouth daily. Refills: 1      metoprolol succinate (TOPROL-XL) 25 mg XL tablet Take 25 mg by mouth daily. Refills: 1             * Follow-up Care/Patient Instructions:   Activity: Activity as tolerated and No driving while on analgesics  Diet: Regular Diet  Wound Care: Keep wound clean and dry and As directed    Follow-up Information     Follow up With Specialties Details Why Contact Info    Heike Hastings, [address] [facility]  [phone]          Follow-up tests/labs None    Signed:  Opal Buenrostro MD  5/16/2019  8:42 AM

## 2019-05-17 NOTE — PROGRESS NOTES
Removed ace warp dressing in order to shower, right chest incision with steri-stripes, edema surrounding tissue, tender, J.P. drain, taped to [facility] abd secure, assisted with shower, completed without difficulty, returned to bed and re-wrapped chest with  observing and verbalized that he would be comfortable wrapping chest dressing at home.

## 2019-06-18 NOTE — H&P (VIEW-ONLY)
Leif Mclaughlin MD  
Bariatric & [facility] Lapar[facility] Surgery [facility] Endoscopy [address], [address] [place] Phone [phone]   Fax [phone] 
 
 
poDate: 2019 Name: Giovana Jara     
MRN: [id number] : 1935 Age: 82 y.o. Sex: female Melany Nunez MD  
 
CC:   
No chief complaint on file. 2019  The patient presents for a post-op visit s/p right mastectomy on 5/15/2019. She is doing well. No nausea and vomiting. No pain. Tolerating diet. Bruising improving. No erythema or drainage from wound. She does have an area of black eschar in her mid wound. No re accumulation of fluid after CATRACHO drain removed. She does reports feeling tired but she has been walking 1 mile a day and accept she has been exerting herself little too much. 
 
2019 - She was here last week with a right breast seroma after CATRACHO drain removal. Dr. Tod Lundy drained seroma and due to erythema in skin placed her on Bactrim. She has been taking bactrim and has 3 more days left. She is feeling well. No fever, chills. No drainage from wound. Energy improving. 2019 - Feeling better. More energy. Wound pink and healthy. Packing twice a day. Physical Exam:  
 
There were no vitals taken for this visit. General: Alert, oriented, cooperative and in no acute distress. Right breast Incision clean, dry and intact. No bruising, no erythema. 5 x 2cm open wound with muscle exposed. Assessment/Plan:  Giovana Jara is a 82 y.o. female who is s/p right mastectomy. 1. Plan for debridement of right chest wound and secondary closure 2. Return to full activity pending wound healing 3. Regular diet Signed: Leif Mclaughlin MD 
[address] [facility] - Bariatric & Minimally Invasive Surgery 2019

## 2019-06-19 ENCOUNTER — HOSPITAL ENCOUNTER (OUTPATIENT)
Dept: SURGERY | Age: 84
Discharge: HOME | End: 2019-06-19

## 2019-06-19 VITALS — HEIGHT: 64 IN | WEIGHT: 140 LBS | BODY MASS INDEX: 23.9 KG/M2

## 2019-06-19 NOTE — PERIOP NOTES
Patient verified name and . Order for consent NOT found in EHR; patient verifies procedure. Type 1B surgery, phone assessment complete. Orders NOT received. Labs per surgeon: no orders in EMR at this time  Labs per anesthesia protocol: none    Patient answered medical/surgical history questions at their best of ability. All prior to admission medications documented in [facility]. Patient instructed to take the following medications the day of surgery according to anesthesia guidelines with a small sip of water: lexapro, metoprolol, oxybutynin. Hold all vitamins 7 days prior to surgery and NSAIDS 5 days prior to surgery. Prescription meds to hold: losartan DOS, meloxicam x 5 days prior. Patient instructed on the following:  Arrive at A Entrance, time of arrival to be called the day before by 1700  NPO after midnight including gum, mints, and ice chips  Responsible adult must drive patient to the hospital, stay during surgery, and patient will need supervision 24 hours after anesthesia  Use antibacterial soap in shower the night before surgery and on the morning of surgery  All piercings must be removed prior to arrival.    Leave all valuables (money and jewelry) at home but bring insurance card and ID on DOS. Do not wear make-up, nail polish, lotions, cologne, perfumes, powders, or oil on skin. Patient teach back successful and patient demonstrates knowledge of instruction.

## 2019-06-24 ENCOUNTER — ANESTHESIA EVENT (OUTPATIENT)
Dept: SURGERY | Age: 84
End: 2019-06-24
Payer: MEDICARE

## 2019-06-25 ENCOUNTER — HOSPITAL ENCOUNTER (OUTPATIENT)
Age: 84
Setting detail: OUTPATIENT SURGERY
Discharge: HOME | End: 2019-06-25
Attending: SURGERY | Admitting: SURGERY
Payer: MEDICARE

## 2019-06-25 ENCOUNTER — ANESTHESIA (OUTPATIENT)
Dept: SURGERY | Age: 84
End: 2019-06-25
Payer: MEDICARE

## 2019-06-25 VITALS
WEIGHT: 135.2 LBS | DIASTOLIC BLOOD PRESSURE: 61 MMHG | OXYGEN SATURATION: 96 % | TEMPERATURE: 98 F | BODY MASS INDEX: 23.21 KG/M2 | SYSTOLIC BLOOD PRESSURE: 126 MMHG | RESPIRATION RATE: 18 BRPM | HEART RATE: 59 BPM

## 2019-06-25 DIAGNOSIS — Z17.0 MALIGNANT NEOPLASM OF RIGHT BREAST IN FEMALE, ESTROGEN RECEPTOR POSITIVE, UNSPECIFIED SITE OF BREAST (HCC): Primary | ICD-10-CM

## 2019-06-25 DIAGNOSIS — C50.911 MALIGNANT NEOPLASM OF RIGHT BREAST IN FEMALE, ESTROGEN RECEPTOR POSITIVE, UNSPECIFIED SITE OF BREAST (HCC): Primary | ICD-10-CM

## 2019-06-25 PROCEDURE — 77030012935 HC DRSG AQUACEL BMS -B: Performed by: SURGERY

## 2019-06-25 PROCEDURE — 77030003029 HC SUT VCRL J&J -B: Performed by: SURGERY

## 2019-06-25 PROCEDURE — 74011250636 HC RX REV CODE- 250/636

## 2019-06-25 PROCEDURE — 77030032490 HC SLV COMPR SCD KNE COVD -B: Performed by: SURGERY

## 2019-06-25 PROCEDURE — 76060000032 HC ANESTHESIA 0.5 TO 1 HR: Performed by: SURGERY

## 2019-06-25 PROCEDURE — 76210000021 HC REC RM PH II 0.5 TO 1 HR: Performed by: SURGERY

## 2019-06-25 PROCEDURE — 76010000138 HC OR TIME 0.5 TO 1 HR: Performed by: SURGERY

## 2019-06-25 PROCEDURE — 77030018836 HC SOL IRR NACL ICUM -A: Performed by: SURGERY

## 2019-06-25 PROCEDURE — 76210000063 HC OR PH I REC FIRST 0.5 HR: Performed by: SURGERY

## 2019-06-25 PROCEDURE — 74011250636 HC RX REV CODE- 250/636: Performed by: SURGERY

## 2019-06-25 PROCEDURE — 77030002916 HC SUT ETHLN J&J -A: Performed by: SURGERY

## 2019-06-25 PROCEDURE — 74011000250 HC RX REV CODE- 250: Performed by: SURGERY

## 2019-06-25 PROCEDURE — 74011250636 HC RX REV CODE- 250/636: Performed by: ANESTHESIOLOGY

## 2019-06-25 RX ORDER — OXYCODONE HYDROCHLORIDE 5 MG/1
5 TABLET ORAL
Status: DISCONTINUED | OUTPATIENT
Start: 2019-06-25 | End: 2019-06-25 | Stop reason: HOSPADM

## 2019-06-25 RX ORDER — CEFAZOLIN SODIUM/WATER 2 G/20 ML
2 SYRINGE (ML) INTRAVENOUS ONCE
Status: COMPLETED | OUTPATIENT
Start: 2019-06-25 | End: 2019-06-25

## 2019-06-25 RX ORDER — LIDOCAINE HYDROCHLORIDE 10 MG/ML
0.1 INJECTION INFILTRATION; PERINEURAL AS NEEDED
Status: DISCONTINUED | OUTPATIENT
Start: 2019-06-25 | End: 2019-06-25 | Stop reason: HOSPADM

## 2019-06-25 RX ORDER — BUPIVACAINE HYDROCHLORIDE 5 MG/ML
INJECTION, SOLUTION EPIDURAL; INTRACAUDAL AS NEEDED
Status: DISCONTINUED | OUTPATIENT
Start: 2019-06-25 | End: 2019-06-25 | Stop reason: HOSPADM

## 2019-06-25 RX ORDER — PROPOFOL 10 MG/ML
INJECTION, EMULSION INTRAVENOUS AS NEEDED
Status: DISCONTINUED | OUTPATIENT
Start: 2019-06-25 | End: 2019-06-25 | Stop reason: HOSPADM

## 2019-06-25 RX ORDER — PROPOFOL 10 MG/ML
INJECTION, EMULSION INTRAVENOUS
Status: DISCONTINUED | OUTPATIENT
Start: 2019-06-25 | End: 2019-06-25 | Stop reason: HOSPADM

## 2019-06-25 RX ORDER — HYDROMORPHONE HYDROCHLORIDE 2 MG/ML
0.5 INJECTION, SOLUTION INTRAMUSCULAR; INTRAVENOUS; SUBCUTANEOUS
Status: DISCONTINUED | OUTPATIENT
Start: 2019-06-25 | End: 2019-06-25 | Stop reason: HOSPADM

## 2019-06-25 RX ORDER — SODIUM CHLORIDE, SODIUM LACTATE, POTASSIUM CHLORIDE, CALCIUM CHLORIDE 600; 310; 30; 20 MG/100ML; MG/100ML; MG/100ML; MG/100ML
75 INJECTION, SOLUTION INTRAVENOUS CONTINUOUS
Status: DISCONTINUED | OUTPATIENT
Start: 2019-06-25 | End: 2019-06-25 | Stop reason: HOSPADM

## 2019-06-25 RX ORDER — FENTANYL CITRATE 50 UG/ML
INJECTION, SOLUTION INTRAMUSCULAR; INTRAVENOUS AS NEEDED
Status: DISCONTINUED | OUTPATIENT
Start: 2019-06-25 | End: 2019-06-25 | Stop reason: HOSPADM

## 2019-06-25 RX ORDER — DIPHENHYDRAMINE HYDROCHLORIDE 50 MG/ML
12.5 INJECTION, SOLUTION INTRAMUSCULAR; INTRAVENOUS
Status: DISCONTINUED | OUTPATIENT
Start: 2019-06-25 | End: 2019-06-25 | Stop reason: HOSPADM

## 2019-06-25 RX ORDER — ONDANSETRON 2 MG/ML
INJECTION INTRAMUSCULAR; INTRAVENOUS AS NEEDED
Status: DISCONTINUED | OUTPATIENT
Start: 2019-06-25 | End: 2019-06-25 | Stop reason: HOSPADM

## 2019-06-25 RX ORDER — NALOXONE HYDROCHLORIDE 0.4 MG/ML
0.1 INJECTION, SOLUTION INTRAMUSCULAR; INTRAVENOUS; SUBCUTANEOUS
Status: DISCONTINUED | OUTPATIENT
Start: 2019-06-25 | End: 2019-06-25 | Stop reason: HOSPADM

## 2019-06-25 RX ORDER — OXYCODONE AND ACETAMINOPHEN 5; 325 MG/1; MG/1
1 TABLET ORAL
Qty: 30 TAB | Refills: 0 | Status: SHIPPED | OUTPATIENT
Start: 2019-06-25 | End: 2019-06-30

## 2019-06-25 RX ORDER — SODIUM CHLORIDE, SODIUM LACTATE, POTASSIUM CHLORIDE, CALCIUM CHLORIDE 600; 310; 30; 20 MG/100ML; MG/100ML; MG/100ML; MG/100ML
100 INJECTION, SOLUTION INTRAVENOUS CONTINUOUS
Status: DISCONTINUED | OUTPATIENT
Start: 2019-06-25 | End: 2019-06-25 | Stop reason: HOSPADM

## 2019-06-25 RX ORDER — FLUMAZENIL 0.1 MG/ML
0.2 INJECTION INTRAVENOUS
Status: DISCONTINUED | OUTPATIENT
Start: 2019-06-25 | End: 2019-06-25 | Stop reason: HOSPADM

## 2019-06-25 RX ADMIN — PROPOFOL 50 MCG/KG/MIN: 10 INJECTION, EMULSION INTRAVENOUS at 09:26

## 2019-06-25 RX ADMIN — ONDANSETRON 4 MG: 2 INJECTION INTRAMUSCULAR; INTRAVENOUS at 09:35

## 2019-06-25 RX ADMIN — Medication 2 G: at 09:20

## 2019-06-25 RX ADMIN — SODIUM CHLORIDE, SODIUM LACTATE, POTASSIUM CHLORIDE, AND CALCIUM CHLORIDE 100 ML/HR: 600; 310; 30; 20 INJECTION, SOLUTION INTRAVENOUS at 08:00

## 2019-06-25 RX ADMIN — FENTANYL CITRATE 100 MCG: 50 INJECTION, SOLUTION INTRAMUSCULAR; INTRAVENOUS at 09:20

## 2019-06-25 RX ADMIN — PROPOFOL 100 MG: 10 INJECTION, EMULSION INTRAVENOUS at 09:18

## 2019-06-25 NOTE — OP NOTES
Operative Report    Name: Jed Ann      MRN: [id number]       : 1935       Age: 82 y.o. Sex: female    Date of Surgery: 2019     Preoperative Diagnosis: Status post right mastectomy [Z90.11]  Malignant neoplasm of right female breast ([address] [C50.911]     Postoperative Diagnosis: Status post right mastectomy [Z90.11]  Malignant neoplasm of right female breast ([address]     Name of procedure:  Procedure(s):  RIGHT  CHEST WOUND 7 x 3 CM DEBRIDEMENT OF SKIN AND SUBCUTANEOUS TISSUE  ELEVATION OF SKIN FLAPS  SECONDARY INTERMEDIATE RIGHT CHEST WOUND CLOSURE      Surgeon:  Surgeon(s) and Role:     * Aury Pike MD - Primary    Anesthesia: MAC     Complications: None    Estimated Blood Loss: Minimal           Specimens: * No specimens in log *    Statement of Medical Necessity: Jed Ann is a 82 y.o. female who has history of right breast cancer and underwent a right mastectomy develops a small area of necrosis in her incision necrotic wound that was debrided in clinic and the wound be d was pink and healthy and she desire closure. We discussed risks, benefits and alternatives of surgery. Patient understood and agreed to proceed with excision. We discussed risks including but not limited to pain, infection, bleeding, scar,  injury to nearby nerves and vessels and cautery injury. Procedure Details   After informed consent was taken, patient was taken to the operating room and placed in supine position. Anesthesia was induced. Patient received preoperative antibiotics. Right chest was prepped and draped in standard sterile fashion. A timeout was performed with all team member present. Local anesthesia was injected. The wound measured 7 x 3 cm. The edges of the wound were sharply debrided. The wound was irrigated and flaps were raised bilaterally to be able to reapproximate the edges of the wound.  The incision defect in the skin measured 7 x 3 cm and was close in multiple layers to prevent dehiscence. The deep dermis was closed with 3-0 Vicryl suture in an interrupted fashion and the skin was closed with 3-0 Nylon suture in a interrupted vertical mattress fashion. An Aquacel Ag dressing was placed. The patient tolerated the procedure well and there were no immediate complications. The patient was awakened from anesthesia, transferred to a stretcher, and transported to the PACU in good condition.         Signed by: Case Meyer MD  [facility] - Bariatric & Minimally Invasive Surgery  6/25/2019 10:00 AM

## 2019-06-25 NOTE — ADDENDUM NOTE
Addendum  created 06/25/19 1347 by Bowen Toro, CRNA    Intraprocedure Meds edited, Orders acknowledged in Narrator

## 2019-06-25 NOTE — ANESTHESIA PREPROCEDURE EVALUATION
Anesthetic History   No history of anesthetic complications            Review of Systems / Medical History  Patient summary reviewed and pertinent labs reviewed    Pulmonary  Within defined limits                 Neuro/Psych         Psychiatric history     Cardiovascular    Hypertension              Exercise tolerance: >4 METS     GI/Hepatic/Renal  Within defined limits              Endo/Other        Arthritis and cancer (breast)     Other Findings              Physical Exam    Airway  Mallampati: II  TM Distance: 4 - 6 cm  Neck ROM: normal range of motion   Mouth opening: Normal     Cardiovascular  Regular rate and rhythm,  S1 and S2 normal,  no murmur, click, rub, or gallop  Rhythm: regular  Rate: normal      Pertinent negatives: No murmur   Dental    Dentition: Implants     Pulmonary  Breath sounds clear to auscultation               Abdominal  GI exam deferred       Other Findings            Anesthetic Plan    ASA: 2  Anesthesia type: total IV anesthesia          Induction: Intravenous  Anesthetic plan and risks discussed with: Patient

## 2019-06-25 NOTE — DISCHARGE INSTRUCTIONS
Patient Education      No bathtub or pool for 2 weeks. Ok to shower. Wear a sport bra or supportive tank for support for the next 3-4 weeks. Remove the dressing in 2 days. Leave the drain intact. Take tylenol or ibuprofen for as needed for mild pain every 4-6 hours. Percocet prescribed for mod to severe pain. This is a narcotic and can cause drowsiness, nausea and vomiting and constipation. Take Colace 100mg BID (over the counter) if constipated. No heavy lifting with the side of surgery. Do not lift right arm over your shoulder. [place] Node Biopsy for Breast Cancer: What to Expect at Home  Your Recovery  After a sentinel node biopsy, many women have no side effects. Some women have pain or bruising at the cut (incision) and feel tired. Your breast and underarm area may be slightly swollen. This may last a few days. You should feel close to normal in a few days. The incision the doctor made usually heals in about 2 weeks. The scar usually fades with time. Some women have a buildup of fluid in the area where the lymph nodes were removed. This is known as seroma. This goes away on its own, or your doctor can drain it. When you had this test, your doctor injected blue dye or radioactive material (or both) into your breast. The blue dye may give your breast a bluish color and turn your urine green for about 24 hours. The radioactive material leaves the body on its own in 24 to 48 hours. A sentinel node biopsy may be done at the same time as other breast surgeries. If this is the case, how you recover will be different. This care sheet gives you a general idea about how long it will take for you to recover. But each person recovers at a different pace. Follow the steps below to get better as quickly as possible. How can you care for yourself at home? Activity    · Rest when you feel tired. Getting enough sleep will help you recover.     · Try to walk each day.  Start by walking a little more than you did the day before. Bit by bit, increase the amount you walk. Walking boosts blood flow and helps prevent pneumonia and constipation.     · You may drive when you are no longer taking pain medicine and you feel up to it.     · You can lift things when you feel comfortable doing so.     · Most women return to work and their normal routines in 2 to 7 days.     · You may shower 24 to 48 hours after surgery, if your doctor okays it. Pat the incision dry. Do not take a bath for the first 2 weeks, or until your doctor tells you it is okay.     · Avoid activity or exercise that may put stress on the cut. This includes washing windows, vacuuming, or gardening with the affected arm. Diet    · You can eat your normal diet. If your stomach is upset, try bland, low-fat foods like plain rice, broiled chicken, toast, and yogurt.     · You may notice that your bowels are not regular right after your surgery. This is common. Try to avoid constipation and straining with bowel movements. Take a fiber supplement such as Citrucel or Metamucil every day. If you have not had a bowel movement after a couple of days, take a mild laxative. Medicines    · Your doctor will tell you if and when you can restart your medicines. He or she will also give you instructions about taking any new medicines.     · If you take blood thinners, such as warfarin (Coumadin), clopidogrel (Plavix), or aspirin, be sure to talk to your doctor. He or she will tell you if and when to start taking those medicines again. Make sure that you understand exactly what your doctor wants you to do.     · Take pain medicines exactly as directed. ? If the doctor gave you a prescription medicine for pain, take it as prescribed. ? If you are not taking a prescription pain medicine, take an over-the-counter medicine such as acetaminophen (Tylenol), ibuprofen (Advil, Motrin), or naproxen (Aleve). Read and follow all instructions on the label.   ? Do not take two or more pain medicines at the same time unless the doctor told you to. Many pain medicines have acetaminophen, which is Tylenol. Too much acetaminophen (Tylenol) can be harmful.     · If your doctor prescribed antibiotics, take them as directed. Do not stop taking them just because you feel better. You need to take the full course of antibiotics.     · If you think your pain medicine is making you sick to your stomach:  ? Take your medicine after meals (unless your doctor has told you not to). ? Ask your doctor for a different pain medicine. Incision care    · If you have strips of tape on the cut (incision) the doctor made, leave the tape on for about 1 week or until it falls off.     · After you can shower, wash the area daily with warm, soapy water and pat it dry. Follow-up care is a key part of your treatment and safety. Be sure to make and go to all appointments, and call your doctor if you are having problems. It's also a good idea to know your test results and keep a list of the medicines you take. When should you call for help? Call 911 anytime you think you may need emergency care. For example, call if:    · You passed out (lost consciousness).     · You have chest pain, are short of breath, or cough up blood.    Call your doctor now or seek immediate medical care if:    · You have pain that does not get better after you take pain medicine.     · You cannot pass stools or gas.     · You are sick to your stomach or cannot drink fluids.     · You have signs of a blood clot in your leg (called a deep vein thrombosis), such as:  ? Pain in your calf, back of the knee, thigh, or groin. ? Redness or swelling in your leg.     · You have signs of infection, such as:  ? Increased pain, swelling, warmth, or redness. ? Red streaks leading from the incision. ? Pus draining from the incision. ?  A fever.     · You have loose stitches, or your incision comes open.     · Bright red blood has soaked through the bandage over your incision.    Watch closely for changes in your health, and be sure to contact your doctor if:    · You have any problems.     · You have new or worse swelling or pain in your arm. Where can you learn more? Go to http://lenora-uday.info/. Enter 814 [id number] in the search box to learn more about \"[place] Node Biopsy for Breast Cancer: What to Expect at Home. \"  Current as of: March 27, 2018  Content Version: 11.9  © 4769-1991 [employer]. Care instructions adapted under license by [employer] (which disclaims liability or warranty for this information). If you have questions about a medical condition or this instruction, always ask your healthcare professional. [address] any warranty or liability for your use of this information.

## 2019-06-25 NOTE — ANESTHESIA POSTPROCEDURE EVALUATION
Procedure(s):  RIGHT  CHEST WOUND DEBRIDEMENT  AND CLOSURE.    general    Anesthesia Post Evaluation      Multimodal analgesia: multimodal analgesia used between 6 hours prior to anesthesia start to PACU discharge  Patient location during evaluation: PACU  Patient participation: complete - patient participated  Level of consciousness: awake and awake and alert  Pain management: adequate  Airway patency: patent  Anesthetic complications: no  Cardiovascular status: acceptable  Respiratory status: acceptable  Hydration status: acceptable  Post anesthesia nausea and vomiting:  controlled      Vitals Value Taken Time   /61 6/25/2019 10:18 AM   Temp 36.2 °C (97.2 °F) 6/25/2019 10:03 AM   Pulse 59 6/25/2019 10:18 AM   Resp 18 6/25/2019 10:18 AM   SpO2 96 % 6/25/2019 10:18 AM

## 2019-06-25 NOTE — BRIEF OP NOTE
BRIEF OPERATIVE NOTE    Date of Procedure: 6/25/2019   Preoperative Diagnosis: Status post right mastectomy [Z90.11]  Malignant neoplasm of right female breast ([facility] [C50.911]  Postoperative Diagnosis: Status post right mastectomy [Z90.11]  Malignant neoplasm of right female breast ([address] [C50.911]    Procedure(s):  RIGHT  CHEST WOUND 7 x 3 CM DEBRIDEMENT OF SKIN AND SUBCUTANEOUS TISSUE  SECONDARY INTERMEDIATE RIGHT CHEST WOUND CLOSURE  Surgeon(s) and Role:     * Zaida Layne MD - Primary         Surgical Assistant: None    Surgical Staff:  Circ-1: Kallie Jordan RN  Event Time In Time Out   Incision Start 2649    Incision Close 4605      Anesthesia: MAC   Estimated Blood Loss: Minimal  Specimens: * No specimens in log *   Findings: right chest wound 7 x 3cm - pink and healthy bed - closed secondarily over a vessel loop  Complications: None  Implants: * No implants in log *

## 2019-07-09 ENCOUNTER — PATIENT OUTREACH (OUTPATIENT)
Dept: CASE MANAGEMENT | Age: 84
End: 2019-07-09

## 2019-07-09 ENCOUNTER — HOSPITAL ENCOUNTER (OUTPATIENT)
Dept: LAB | Age: 84
Discharge: HOME | End: 2019-07-09
Payer: MEDICARE

## 2019-07-09 DIAGNOSIS — C50.911 MALIGNANT NEOPLASM OF RIGHT BREAST IN FEMALE, ESTROGEN RECEPTOR POSITIVE, UNSPECIFIED SITE OF BREAST (HCC): ICD-10-CM

## 2019-07-09 DIAGNOSIS — Z17.0 MALIGNANT NEOPLASM OF RIGHT BREAST IN FEMALE, ESTROGEN RECEPTOR POSITIVE, UNSPECIFIED SITE OF BREAST (HCC): ICD-10-CM

## 2019-07-09 LAB
ALBUMIN SERPL-MCNC: 4.3 G/DL (ref 3.2–4.6)
ALBUMIN/GLOB SERPL: 1.3 {RATIO} (ref 1.2–3.5)
ALP SERPL-CCNC: 83 U/L (ref 50–136)
ALT SERPL-CCNC: 19 U/L (ref 12–65)
ANION GAP SERPL CALC-SCNC: 4 MMOL/L (ref 7–16)
AST SERPL-CCNC: 16 U/L (ref 15–37)
BASOPHILS # BLD: 0 K/UL (ref 0–0.2)
BASOPHILS NFR BLD: 1 % (ref 0–2)
BILIRUB SERPL-MCNC: 0.7 MG/DL (ref 0.2–1.1)
BUN SERPL-MCNC: 27 MG/DL (ref 8–23)
CALCIUM SERPL-MCNC: 9.7 MG/DL (ref 8.3–10.4)
CHLORIDE SERPL-SCNC: 105 MMOL/L (ref 98–107)
CO2 SERPL-SCNC: 29 MMOL/L (ref 21–32)
CREAT SERPL-MCNC: 0.91 MG/DL (ref 0.6–1)
DIFFERENTIAL METHOD BLD: ABNORMAL
EOSINOPHIL # BLD: 0.2 K/UL (ref 0–0.8)
EOSINOPHIL NFR BLD: 4 % (ref 0.5–7.8)
ERYTHROCYTE [DISTWIDTH] IN BLOOD BY AUTOMATED COUNT: 13.2 % (ref 11.9–14.6)
GLOBULIN SER CALC-MCNC: 3.4 G/DL (ref 2.3–3.5)
GLUCOSE SERPL-MCNC: 94 MG/DL (ref 65–100)
HCT VFR BLD AUTO: 39.1 % (ref 35.8–46.3)
HGB BLD-MCNC: 12.8 G/DL (ref 11.7–15.4)
IMM GRANULOCYTES # BLD AUTO: 0 K/UL (ref 0–0.5)
IMM GRANULOCYTES NFR BLD AUTO: 1 % (ref 0–5)
LYMPHOCYTES # BLD: 1.3 K/UL (ref 0.5–4.6)
LYMPHOCYTES NFR BLD: 24 % (ref 13–44)
MCH RBC QN AUTO: 29.9 PG (ref 26.1–32.9)
MCHC RBC AUTO-ENTMCNC: 32.7 G/DL (ref 31.4–35)
MCV RBC AUTO: 91.4 FL (ref 79.6–97.8)
MONOCYTES # BLD: 0.8 K/UL (ref 0.1–1.3)
MONOCYTES NFR BLD: 14 % (ref 4–12)
NEUTS SEG # BLD: 3.1 K/UL (ref 1.7–8.2)
NEUTS SEG NFR BLD: 56 % (ref 43–78)
NRBC # BLD: 0 K/UL (ref 0–0.2)
PLATELET # BLD AUTO: 232 K/UL (ref 150–450)
PMV BLD AUTO: 9.9 FL (ref 9.4–12.3)
POTASSIUM SERPL-SCNC: 4.5 MMOL/L (ref 3.5–5.1)
PROT SERPL-MCNC: 7.7 G/DL (ref 6.3–8.2)
RBC # BLD AUTO: 4.28 M/UL (ref 4.05–5.25)
SODIUM SERPL-SCNC: 138 MMOL/L (ref 136–145)
WBC # BLD AUTO: 5.5 K/UL (ref 4.3–11.1)

## 2019-07-09 PROCEDURE — 36415 COLL VENOUS BLD VENIPUNCTURE: CPT

## 2019-07-09 PROCEDURE — 80053 COMPREHEN METABOLIC PANEL: CPT

## 2019-07-09 PROCEDURE — 85025 COMPLETE CBC W/AUTO DIFF WBC: CPT

## 2019-07-09 NOTE — PROGRESS NOTES
7/9/2019 Saw the patient with Nikhil Ruiz NP. She is still working with Dr Rosa Meyer regarding her right breast wound healing. She has not started the Arimidex but sees Dr Rosa Meyer today  To see if she is clear to start the arimidex. She is struggling emotionally as she is a  and  and this has come to a halt due to all the surgeries. She is wanting to be referred to genetics. She will start Arimidex. We will update her bone density. We discussed the treatment summary and this will be mailed to her. Navigation will sign off.

## 2019-07-16 ENCOUNTER — HOSPITAL ENCOUNTER (OUTPATIENT)
Dept: LAB | Age: 84
Discharge: HOME | End: 2019-07-16
Payer: MEDICARE

## 2019-07-16 DIAGNOSIS — Z80.3 FAMILY HISTORY OF BREAST CANCER: ICD-10-CM

## 2019-07-16 DIAGNOSIS — Z85.3 PERSONAL HISTORY OF BREAST CANCER: ICD-10-CM

## 2019-07-16 LAB
Lab: NORMAL
REFERENCE LAB,REFLB: NORMAL
TEST DESCRIPTION:,ATST: NORMAL

## 2019-07-16 PROCEDURE — 36415 COLL VENOUS BLD VENIPUNCTURE: CPT

## 2019-08-08 ENCOUNTER — HOSPITAL ENCOUNTER (OUTPATIENT)
Dept: LAB | Age: 84
Discharge: HOME | End: 2019-08-08
Payer: MEDICARE

## 2019-08-08 DIAGNOSIS — C50.911 MALIGNANT NEOPLASM OF RIGHT BREAST IN FEMALE, ESTROGEN RECEPTOR POSITIVE, UNSPECIFIED SITE OF BREAST (HCC): ICD-10-CM

## 2019-08-08 DIAGNOSIS — Z17.0 MALIGNANT NEOPLASM OF RIGHT BREAST IN FEMALE, ESTROGEN RECEPTOR POSITIVE, UNSPECIFIED SITE OF BREAST (HCC): ICD-10-CM

## 2019-08-08 LAB
ALBUMIN SERPL-MCNC: 4.3 G/DL (ref 3.2–4.6)
ALBUMIN/GLOB SERPL: 1.4 {RATIO} (ref 1.2–3.5)
ALP SERPL-CCNC: 73 U/L (ref 50–136)
ALT SERPL-CCNC: 19 U/L (ref 12–65)
ANION GAP SERPL CALC-SCNC: 5 MMOL/L (ref 7–16)
AST SERPL-CCNC: 16 U/L (ref 15–37)
BASOPHILS # BLD: 0 K/UL (ref 0–0.2)
BASOPHILS NFR BLD: 1 % (ref 0–2)
BILIRUB SERPL-MCNC: 0.8 MG/DL (ref 0.2–1.1)
BUN SERPL-MCNC: 24 MG/DL (ref 8–23)
CALCIUM SERPL-MCNC: 9.6 MG/DL (ref 8.3–10.4)
CHLORIDE SERPL-SCNC: 105 MMOL/L (ref 98–107)
CO2 SERPL-SCNC: 27 MMOL/L (ref 21–32)
CREAT SERPL-MCNC: 1.05 MG/DL (ref 0.6–1)
DIFFERENTIAL METHOD BLD: ABNORMAL
EOSINOPHIL # BLD: 0.2 K/UL (ref 0–0.8)
EOSINOPHIL NFR BLD: 4 % (ref 0.5–7.8)
ERYTHROCYTE [DISTWIDTH] IN BLOOD BY AUTOMATED COUNT: 13.5 % (ref 11.9–14.6)
GLOBULIN SER CALC-MCNC: 3.1 G/DL (ref 2.3–3.5)
GLUCOSE SERPL-MCNC: 89 MG/DL (ref 65–100)
HCT VFR BLD AUTO: 38.2 % (ref 35.8–46.3)
HGB BLD-MCNC: 12.5 G/DL (ref 11.7–15.4)
IMM GRANULOCYTES # BLD AUTO: 0 K/UL (ref 0–0.5)
IMM GRANULOCYTES NFR BLD AUTO: 1 % (ref 0–5)
LYMPHOCYTES # BLD: 1.2 K/UL (ref 0.5–4.6)
LYMPHOCYTES NFR BLD: 27 % (ref 13–44)
MCH RBC QN AUTO: 30 PG (ref 26.1–32.9)
MCHC RBC AUTO-ENTMCNC: 32.7 G/DL (ref 31.4–35)
MCV RBC AUTO: 91.6 FL (ref 79.6–97.8)
MONOCYTES # BLD: 0.6 K/UL (ref 0.1–1.3)
MONOCYTES NFR BLD: 13 % (ref 4–12)
NEUTS SEG # BLD: 2.4 K/UL (ref 1.7–8.2)
NEUTS SEG NFR BLD: 54 % (ref 43–78)
NRBC # BLD: 0 K/UL (ref 0–0.2)
PLATELET # BLD AUTO: 219 K/UL (ref 150–450)
PMV BLD AUTO: 9.6 FL (ref 9.4–12.3)
POTASSIUM SERPL-SCNC: 4.4 MMOL/L (ref 3.5–5.1)
PROT SERPL-MCNC: 7.4 G/DL (ref 6.3–8.2)
RBC # BLD AUTO: 4.17 M/UL (ref 4.05–5.25)
SODIUM SERPL-SCNC: 137 MMOL/L (ref 136–145)
WBC # BLD AUTO: 4.4 K/UL (ref 4.3–11.1)

## 2019-08-08 PROCEDURE — 36415 COLL VENOUS BLD VENIPUNCTURE: CPT

## 2019-08-08 PROCEDURE — 85025 COMPLETE CBC W/AUTO DIFF WBC: CPT

## 2019-08-08 PROCEDURE — 80053 COMPREHEN METABOLIC PANEL: CPT

## 2019-08-11 PROBLEM — N89.8 VAGINAL DRYNESS: Status: ACTIVE | Noted: 2019-08-11

## 2019-10-01 ENCOUNTER — HOSPITAL ENCOUNTER (OUTPATIENT)
Dept: MAMMOGRAPHY | Age: 84
Discharge: HOME | End: 2019-10-01
Attending: NURSE PRACTITIONER
Payer: MEDICARE

## 2019-10-01 DIAGNOSIS — Z91.89 AT RISK FOR OSTEOPOROSIS: ICD-10-CM

## 2019-10-01 DIAGNOSIS — Z79.811 LONG TERM CURRENT USE OF AROMATASE INHIBITOR: ICD-10-CM

## 2019-10-01 PROCEDURE — 77080 DXA BONE DENSITY AXIAL: CPT

## 2019-11-07 ENCOUNTER — HOSPITAL ENCOUNTER (OUTPATIENT)
Dept: LAB | Age: 84
Discharge: HOME | End: 2019-11-07
Payer: MEDICARE

## 2019-11-07 DIAGNOSIS — C50.911 MALIGNANT NEOPLASM OF RIGHT BREAST IN FEMALE, ESTROGEN RECEPTOR POSITIVE, UNSPECIFIED SITE OF BREAST (HCC): ICD-10-CM

## 2019-11-07 DIAGNOSIS — Z17.0 MALIGNANT NEOPLASM OF RIGHT BREAST IN FEMALE, ESTROGEN RECEPTOR POSITIVE, UNSPECIFIED SITE OF BREAST (HCC): ICD-10-CM

## 2019-11-07 LAB
ALBUMIN SERPL-MCNC: 4.3 G/DL (ref 3.2–4.6)
ALBUMIN/GLOB SERPL: 1.3 {RATIO} (ref 1.2–3.5)
ALP SERPL-CCNC: 79 U/L (ref 50–136)
ALT SERPL-CCNC: 24 U/L (ref 12–65)
ANION GAP SERPL CALC-SCNC: 6 MMOL/L (ref 7–16)
AST SERPL-CCNC: 18 U/L (ref 15–37)
BASOPHILS # BLD: 0 K/UL (ref 0–0.2)
BASOPHILS NFR BLD: 1 % (ref 0–2)
BILIRUB SERPL-MCNC: 0.9 MG/DL (ref 0.2–1.1)
BUN SERPL-MCNC: 33 MG/DL (ref 8–23)
CALCIUM SERPL-MCNC: 9.8 MG/DL (ref 8.3–10.4)
CHLORIDE SERPL-SCNC: 108 MMOL/L (ref 98–107)
CO2 SERPL-SCNC: 26 MMOL/L (ref 21–32)
CREAT SERPL-MCNC: 0.81 MG/DL (ref 0.6–1)
DIFFERENTIAL METHOD BLD: ABNORMAL
EOSINOPHIL # BLD: 0.3 K/UL (ref 0–0.8)
EOSINOPHIL NFR BLD: 5 % (ref 0.5–7.8)
ERYTHROCYTE [DISTWIDTH] IN BLOOD BY AUTOMATED COUNT: 12.6 % (ref 11.9–14.6)
GLOBULIN SER CALC-MCNC: 3.3 G/DL (ref 2.3–3.5)
GLUCOSE SERPL-MCNC: 87 MG/DL (ref 65–100)
HCT VFR BLD AUTO: 41.1 % (ref 35.8–46.3)
HGB BLD-MCNC: 13.4 G/DL (ref 11.7–15.4)
IMM GRANULOCYTES # BLD AUTO: 0 K/UL (ref 0–0.5)
IMM GRANULOCYTES NFR BLD AUTO: 0 % (ref 0–5)
LYMPHOCYTES # BLD: 1.3 K/UL (ref 0.5–4.6)
LYMPHOCYTES NFR BLD: 28 % (ref 13–44)
MCH RBC QN AUTO: 30.6 PG (ref 26.1–32.9)
MCHC RBC AUTO-ENTMCNC: 32.6 G/DL (ref 31.4–35)
MCV RBC AUTO: 93.8 FL (ref 79.6–97.8)
MONOCYTES # BLD: 0.7 K/UL (ref 0.1–1.3)
MONOCYTES NFR BLD: 14 % (ref 4–12)
NEUTS SEG # BLD: 2.5 K/UL (ref 1.7–8.2)
NEUTS SEG NFR BLD: 52 % (ref 43–78)
NRBC # BLD: 0 K/UL (ref 0–0.2)
PLATELET # BLD AUTO: 229 K/UL (ref 150–450)
PMV BLD AUTO: 10 FL (ref 9.4–12.3)
POTASSIUM SERPL-SCNC: 4.1 MMOL/L (ref 3.5–5.1)
PROT SERPL-MCNC: 7.6 G/DL (ref 6.3–8.2)
RBC # BLD AUTO: 4.38 M/UL (ref 4.05–5.25)
SODIUM SERPL-SCNC: 140 MMOL/L (ref 136–145)
WBC # BLD AUTO: 4.8 K/UL (ref 4.3–11.1)

## 2019-11-07 PROCEDURE — 36415 COLL VENOUS BLD VENIPUNCTURE: CPT

## 2019-11-07 PROCEDURE — 80053 COMPREHEN METABOLIC PANEL: CPT

## 2019-11-07 PROCEDURE — 85025 COMPLETE CBC W/AUTO DIFF WBC: CPT

## 2019-11-11 PROBLEM — Z12.31 ENCOUNTER FOR SCREENING MAMMOGRAM FOR MALIGNANT NEOPLASM OF BREAST: Status: ACTIVE | Noted: 2019-11-11

## 2019-11-11 PROBLEM — Z90.710 HX OF VAGINAL HYSTERECTOMY: Status: ACTIVE | Noted: 2019-11-11

## 2019-11-11 PROBLEM — Z90.11 S/P RIGHT MASTECTOMY: Status: ACTIVE | Noted: 2019-11-11

## 2019-11-11 PROBLEM — Z12.39 BREAST CANCER SCREENING: Status: ACTIVE | Noted: 2019-11-11

## 2019-12-11 PROBLEM — Z12.39 BREAST CANCER SCREENING: Status: RESOLVED | Noted: 2019-11-11 | Resolved: 2019-12-11

## 2019-12-12 ENCOUNTER — HOSPITAL ENCOUNTER (OUTPATIENT)
Dept: MAMMOGRAPHY | Age: 84
Discharge: HOME | End: 2019-12-12
Attending: INTERNAL MEDICINE
Payer: MEDICARE

## 2019-12-12 DIAGNOSIS — Z12.31 ENCOUNTER FOR SCREENING MAMMOGRAM FOR MALIGNANT NEOPLASM OF BREAST: ICD-10-CM

## 2019-12-12 DIAGNOSIS — Z12.39 BREAST CANCER SCREENING: ICD-10-CM

## 2019-12-12 PROCEDURE — 77067 SCR MAMMO BI INCL CAD: CPT

## 2020-03-12 ENCOUNTER — HOSPITAL ENCOUNTER (OUTPATIENT)
Dept: LAB | Age: 85
Discharge: HOME | End: 2020-03-12
Payer: MEDICARE

## 2020-03-12 DIAGNOSIS — C50.911 MALIGNANT NEOPLASM OF RIGHT BREAST IN FEMALE, ESTROGEN RECEPTOR POSITIVE, UNSPECIFIED SITE OF BREAST (HCC): ICD-10-CM

## 2020-03-12 DIAGNOSIS — Z17.0 MALIGNANT NEOPLASM OF RIGHT BREAST IN FEMALE, ESTROGEN RECEPTOR POSITIVE, UNSPECIFIED SITE OF BREAST (HCC): ICD-10-CM

## 2020-03-12 LAB
ALBUMIN SERPL-MCNC: 3.9 G/DL (ref 3.2–4.6)
ALBUMIN/GLOB SERPL: 1.1 {RATIO} (ref 1.2–3.5)
ALP SERPL-CCNC: 92 U/L (ref 50–136)
ALT SERPL-CCNC: 23 U/L (ref 12–65)
ANION GAP SERPL CALC-SCNC: 4 MMOL/L (ref 7–16)
AST SERPL-CCNC: 19 U/L (ref 15–37)
BASOPHILS # BLD: 0.1 K/UL (ref 0–0.2)
BASOPHILS NFR BLD: 1 % (ref 0–2)
BILIRUB SERPL-MCNC: 0.5 MG/DL (ref 0.2–1.1)
BUN SERPL-MCNC: 25 MG/DL (ref 8–23)
CALCIUM SERPL-MCNC: 9.6 MG/DL (ref 8.3–10.4)
CHLORIDE SERPL-SCNC: 109 MMOL/L (ref 98–107)
CO2 SERPL-SCNC: 27 MMOL/L (ref 21–32)
CREAT SERPL-MCNC: 0.83 MG/DL (ref 0.6–1)
DIFFERENTIAL METHOD BLD: ABNORMAL
EOSINOPHIL # BLD: 0.4 K/UL (ref 0–0.8)
EOSINOPHIL NFR BLD: 8 % (ref 0.5–7.8)
ERYTHROCYTE [DISTWIDTH] IN BLOOD BY AUTOMATED COUNT: 13.1 % (ref 11.9–14.6)
GLOBULIN SER CALC-MCNC: 3.5 G/DL (ref 2.3–3.5)
GLUCOSE SERPL-MCNC: 101 MG/DL (ref 65–100)
HCT VFR BLD AUTO: 40.4 % (ref 35.8–46.3)
HGB BLD-MCNC: 13 G/DL (ref 11.7–15.4)
IMM GRANULOCYTES # BLD AUTO: 0 K/UL (ref 0–0.5)
IMM GRANULOCYTES NFR BLD AUTO: 0 % (ref 0–5)
LYMPHOCYTES # BLD: 1.3 K/UL (ref 0.5–4.6)
LYMPHOCYTES NFR BLD: 24 % (ref 13–44)
MCH RBC QN AUTO: 29.9 PG (ref 26.1–32.9)
MCHC RBC AUTO-ENTMCNC: 32.2 G/DL (ref 31.4–35)
MCV RBC AUTO: 92.9 FL (ref 79.6–97.8)
MONOCYTES # BLD: 0.7 K/UL (ref 0.1–1.3)
MONOCYTES NFR BLD: 13 % (ref 4–12)
NEUTS SEG # BLD: 2.8 K/UL (ref 1.7–8.2)
NEUTS SEG NFR BLD: 54 % (ref 43–78)
NRBC # BLD: 0 K/UL (ref 0–0.2)
PLATELET # BLD AUTO: 221 K/UL (ref 150–450)
PMV BLD AUTO: 9.8 FL (ref 9.4–12.3)
POTASSIUM SERPL-SCNC: 4.1 MMOL/L (ref 3.5–5.1)
PROT SERPL-MCNC: 7.4 G/DL (ref 6.3–8.2)
RBC # BLD AUTO: 4.35 M/UL (ref 4.05–5.25)
SODIUM SERPL-SCNC: 140 MMOL/L (ref 136–145)
WBC # BLD AUTO: 5.2 K/UL (ref 4.3–11.1)

## 2020-03-12 PROCEDURE — 36415 COLL VENOUS BLD VENIPUNCTURE: CPT

## 2020-03-12 PROCEDURE — 85025 COMPLETE CBC W/AUTO DIFF WBC: CPT

## 2020-03-12 PROCEDURE — 80053 COMPREHEN METABOLIC PANEL: CPT

## 2020-04-20 PROBLEM — T83.718A EROSION OF BLADDER SUSPENSION MESH (HCC): Status: ACTIVE | Noted: 2020-04-20

## 2020-11-17 ENCOUNTER — TRANSCRIBE ORDER (OUTPATIENT)
Dept: SCHEDULING | Age: 85
End: 2020-11-17

## 2020-11-17 DIAGNOSIS — Z12.31 SCREENING MAMMOGRAM FOR HIGH-RISK PATIENT: Primary | ICD-10-CM

## 2020-12-15 ENCOUNTER — HOSPITAL ENCOUNTER (OUTPATIENT)
Dept: MAMMOGRAPHY | Age: 85
Discharge: HOME | End: 2020-12-15
Attending: FAMILY MEDICINE
Payer: MEDICARE

## 2020-12-15 DIAGNOSIS — Z12.31 SCREENING MAMMOGRAM FOR HIGH-RISK PATIENT: ICD-10-CM

## 2020-12-15 PROCEDURE — 77067 SCR MAMMO BI INCL CAD: CPT

## 2021-11-16 ENCOUNTER — TRANSCRIBE ORDER (OUTPATIENT)
Dept: SCHEDULING | Age: 86
End: 2021-11-16

## 2021-11-16 DIAGNOSIS — Z12.31 SCREENING MAMMOGRAM FOR HIGH-RISK PATIENT: Primary | ICD-10-CM

## 2021-12-17 ENCOUNTER — HOSPITAL ENCOUNTER (OUTPATIENT)
Dept: MAMMOGRAPHY | Age: 86
Discharge: HOME | End: 2021-12-17
Attending: FAMILY MEDICINE
Payer: MEDICARE

## 2021-12-17 DIAGNOSIS — Z12.31 SCREENING MAMMOGRAM FOR HIGH-RISK PATIENT: ICD-10-CM

## 2021-12-17 PROCEDURE — 77063 BREAST TOMOSYNTHESIS BI: CPT

## 2021-12-21 ENCOUNTER — HOSPITAL ENCOUNTER (OUTPATIENT)
Dept: MAMMOGRAPHY | Age: 86
Discharge: HOME | End: 2021-12-21
Attending: FAMILY MEDICINE
Payer: MEDICARE

## 2021-12-21 DIAGNOSIS — Z85.3 HX OF BREAST CANCER: ICD-10-CM

## 2021-12-21 DIAGNOSIS — R92.8 ABNORMAL SCREENING MAMMOGRAM: ICD-10-CM

## 2021-12-21 PROCEDURE — 76642 ULTRASOUND BREAST LIMITED: CPT

## 2021-12-21 PROCEDURE — 77065 DX MAMMO INCL CAD UNI: CPT

## 2022-03-18 PROBLEM — Z90.11 S/P RIGHT MASTECTOMY: Status: ACTIVE | Noted: 2019-11-11

## 2022-03-18 PROBLEM — C50.919 BREAST CANCER (HCC): Status: ACTIVE | Noted: 2019-05-15

## 2022-03-19 PROBLEM — D05.10 BREAST NEOPLASM, TIS (DCIS): Status: ACTIVE | Noted: 2019-03-21

## 2022-03-19 PROBLEM — T83.718A EROSION OF BLADDER SUSPENSION MESH (HCC): Status: ACTIVE | Noted: 2020-04-20

## 2022-03-19 PROBLEM — N89.8 VAGINAL DRYNESS: Status: ACTIVE | Noted: 2019-08-11

## 2022-03-19 PROBLEM — Z90.710 HX OF VAGINAL HYSTERECTOMY: Status: ACTIVE | Noted: 2019-11-11

## 2022-03-20 PROBLEM — Z17.0 MALIGNANT NEOPLASM OF BREAST IN FEMALE, ESTROGEN RECEPTOR POSITIVE (HCC): Status: ACTIVE | Noted: 2019-01-13

## 2022-03-20 PROBLEM — C50.919 MALIGNANT NEOPLASM OF BREAST IN FEMALE, ESTROGEN RECEPTOR POSITIVE (HCC): Status: ACTIVE | Noted: 2019-01-13

## 2022-07-19 ENCOUNTER — TELEPHONE (OUTPATIENT)
Dept: ONCOLOGY | Age: 87
End: 2022-07-19

## 2022-07-19 NOTE — TELEPHONE ENCOUNTER
Spoke with patient regarding medication refill and explained that she will need to come in for a visit with  to see how she would like to proceed with her taking this medication. Explained to patient that she missed her last appointment, so she will need to come in for a follow up for her breast cancer and speak with Dr. Geovanni Valentin about her Letrozole prescription. Patient verbalized understanding.

## 2022-07-19 NOTE — TELEPHONE ENCOUNTER
PT stated she was notified by her pharmacy that they are waiting on a respond back from Jose Hicks to see if she need to continue or discontinue her medication  letrozole 2.5mg

## 2022-08-12 DIAGNOSIS — C50.919 MALIGNANT NEOPLASM OF BREAST IN FEMALE, ESTROGEN RECEPTOR POSITIVE, UNSPECIFIED LATERALITY, UNSPECIFIED SITE OF BREAST (HCC): Primary | ICD-10-CM

## 2022-08-12 DIAGNOSIS — Z17.0 MALIGNANT NEOPLASM OF BREAST IN FEMALE, ESTROGEN RECEPTOR POSITIVE, UNSPECIFIED LATERALITY, UNSPECIFIED SITE OF BREAST (HCC): Primary | ICD-10-CM

## 2022-08-12 NOTE — PROGRESS NOTES
[facility] Hematology and Oncology: Established patient - follow up     Chief Complaint   Patient presents with    Follow-up     Diagnosis: breast cancer    History of Present Illness:  Ms. Erin Childs is a 82 y.o. female who presents today for management of Ms. Erin Childs is a 82 y.o. female who presents today in referral from Dr. All Akers for consultation regarding breast cancer. The past medical history is significant for HTN, constipation, depression, knee replacement,  s/p hysterectomy, bladder repair. She underwent mammogram on 12/12/18 that showed superficial right breast mass at 5:00 6cm from the nipple. 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 She met with Dr Micha Callejas and after extensive discussion she decided that she'd like to undergo lumpectomy. She has hx of previous breast surgery with lumpectomy and XRT in 2007. She thinks she had a SLNx in 2007. She is here  to discuss further tx options. MRI showed additional site of concern and she underwent bx of the breast c/w intraductal hyperplasia. She then underwent a partial mastectomy with close DCIS margins. Resection revealed margins <1mm with DCIS. Social Hx: , non-smoker, drinks about 7 glasses wine/wk, no known exposures   Family Hx: maternal aunt breast cancer at 28, mat aunt 54 breast cancer, son battled metastatic GIST from 33-48. She returns today with her  for follow up. Doing well. She denies any pain. 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 Appetite and energy level are good. + vaginal dryness - using Luvena. She denies any other concerns or complaints. Conidering trying letrozole to see if better tolerated in terms of joint aches.   Of note, had a couple of 2 week breaks from anastrozole due to SE. Back on it. Today, she is here for follow-up. I have not seen her in 2 years. Chart review and interim events noted and discussed. She has been off hormonal therapy due to lack of refills. We will refill her letrozole today and she wishes to continue it. Most recent mammogram with retroareolar small cyst in the left breast otherwise YESSY. Mammogram in December recommended. She continues to follow-up with her other providers. Elevation in  noted and this is being worked up by her GYN unk. No current signs and symptoms of infection she feels well overall. She will be seeing her psychiatrist next week due to worsening depression recently. Her  has lost some of his mobility and this is affecting her mood. No SI or HI. Chronological Events:   9/2007 - [facility] - s/p lumpectomy and XRT for right breast mass - Dr Eliseo Llamas   12/12/18 mammogram - right breast 5:00, 6 cm from nipple, ill defined margins   12/17/18 core biopsy right breast: IDC with lobular features, low grade, %PR65% and her2 carolyn negative   12/28/18 Dr Anaid Nix consultation   1/10/19 oncology consultation    MRI   2/14/19 bx   Partial mastectomy - close DCIS margins, <1mm   Resection of margins - <2mm for DCIS   3/21/19 follow up - discussion of pathology and imaging; Rec OncotypeDX, genetics consult and XRT consult   4/10/19 XRT consultation - Dr Anya Mahoney (no XRT offered)   4/18/19 Dr Anaid Nix f/u   5/15/19 right mastectomy   6/7/19 f/u reviewed path from mastectomy in details, anastrozole   6/25/19 right chest wound/mastectomy site debridement   7/9/19 follow up, has not started AI   8/8/19 f/u, UTI on Bactrim; discussed lubricants, AI   10/1/19 DEXA - osteopenia 15%/5%   11/7/19 f/u doing well on AI, wbc 4.8, Hb 13.4, plts 229, requested referral to new GYN   3/12/20 f/u - doing well; considering letrozole due to joint aches.     8/2022 pt back for FU, last seen in 2020; wishes to c/w AI - refills       Family History   Problem Relation Age of Onset    Breast Cancer Maternal Aunt 28    Stroke Mother     Heart Attack Father     Cancer Sister     Breast Cancer Sister     Heart Disease Mother       Social History     Socioeconomic History    Marital status:      Spouse name: None    Number of children: None    Years of education: None    Highest education level: None   Tobacco Use    Smoking status: Never    Smokeless tobacco: Never   Substance and Sexual Activity    Alcohol use: Yes     Alcohol/week: 7.0 standard drinks    Drug use: Never        Review of Systems   Constitutional:  Negative for appetite change, chills, diaphoresis, fatigue, fever and unexpected weight change. HENT:   Negative for hearing loss, mouth sores, nosebleeds, sore throat, trouble swallowing and voice change. Eyes:  Negative for icterus. Respiratory:  Negative for chest tightness, hemoptysis, shortness of breath and wheezing. Cardiovascular:  Negative for chest pain, leg swelling and palpitations. Gastrointestinal:  Negative for abdominal distention, abdominal pain, blood in stool, constipation, diarrhea, nausea and vomiting. Endocrine: Negative for hot flashes. Genitourinary:  Negative for difficulty urinating, frequency, vaginal bleeding and vaginal discharge. Musculoskeletal:  Positive for arthralgias. Negative for flank pain, gait problem and myalgias. Skin:  Negative for itching, rash and wound. Neurological:  Negative for dizziness, extremity weakness, gait problem, headaches and numbness. Psychiatric/Behavioral:  Positive for depression and sleep disturbance. Negative for confusion. The patient is not nervous/anxious.        Allergies   Allergen Reactions    Prednisone Other (See Comments)     Hyperactivity, insomnia, irritability     Past Medical History:   Diagnosis Date    Breast cancer ([facility] [address]     Depression     controlled with medication     H/O bladder infections     pt reports after bilateral TKA- had rajput once and didn't have rajput once but had \"issues\" after both surgeries    Hypertension     controlled with medication     Menopause     Osteoarthritis     Overactive bladder     controlled with medication     Radiation therapy complication 7017     Past Surgical History:   Procedure Laterality Date    BREAST BIOPSY Right 3/4/2019    RIGHT BREAST BIOPSY performed by Jennie Segal MD at [facility] Right 09/2007    RT Lumpectomy    BREAST LUMPECTOMY Right 09/05/2007    CATARACT REMOVAL Bilateral     HYSTERECTOMY (CERVIX STATUS UNKNOWN)  08/11/2011    with bladder and bowel lift    MASTECTOMY Right 5/15/2019    RIGHT TOTAL BREAST MASTECTOMY performed by Jennie Segal MD at [facility] Right 2/25/2019    RIGHT PARTIAL BREAST MASTECTOMY performed by Jennie Segal MD at [facility]    MRI BREAST BX USING DEVICE RIGHT Right 2/14/2019    MRI BREAST BX USING DEVICE RIGHT 2/14/2019 [facility] MRI    TOTAL KNEE ARTHROPLASTY Right     TOTAL KNEE ARTHROPLASTY Left 2018    US BREAST NEEDLE BIOPSY RIGHT Right 12/17/2018    US BREAST NEEDLE BIOPSY RIGHT 12/17/2018 [facility] MAMMO     Current Outpatient Medications   Medication Sig Dispense Refill    letrozole (FEMARA) 2.5 MG tablet Take 1 tablet by mouth in the morning. TAKE 1 TABLET BY MOUTH DAILYTAKE 1 TABLET BY MOUTH DAILY.  90 tablet 3    acetaminophen (TYLENOL) 500 MG tablet Take by mouth every 6 hours as needed      ascorbic acid (VITAMIN C) 500 MG tablet Take 500 mg by mouth daily      vitamin D 25 MCG (1000 UT) CAPS Take 1,000 Units by mouth daily      escitalopram (LEXAPRO) 5 MG tablet Take 5 mg by mouth daily      losartan (COZAAR) 50 MG tablet Take by mouth daily      magnesium oxide (MAG-OX) 400 (240 Mg) MG tablet Take 400 mg by mouth      Melatonin 5 MG CAPS Take by mouth      meloxicam (MOBIC) 15 MG tablet Take 15 mg by mouth daily      metoprolol succinate (TOPROL XL) 25 MG extended release tablet Take 25 mg by mouth daily      polyethylene glycol (GLYCOLAX) 17 GM/SCOOP powder Take 17 g by mouth daily      OXYBUTYNIN CHLORIDE PO Take by mouth 2 times daily (Patient not taking: Reported on 8/15/2022)      Calcium Carb-Cholecalciferol 600-800 MG-UNIT CHEW Take by mouth (Patient not taking: Reported on 8/15/2022)      cyanocobalamin 100 MCG tablet Take 100 mcg by mouth daily (Patient not taking: Reported on 8/15/2022)       No current facility-administered medications for this visit. No flowsheet data found. OBJECTIVE:  /74 (Site: Right Upper Arm, Position: Sitting, Cuff Size: Medium Adult)   Pulse 60   Temp 97.7 °F (36.5 °C) (Oral)   Resp 22   Ht 5' 3\" (1.6 m)   Wt 147 lb 4.8 oz (66.8 kg)   SpO2 98%   BMI 26.09 kg/m²       ECOG PERFORMANCE STATUS - 0-Fully active, able to carry on all pre-disease performance without restriction. Pain - 0 - No pain/10. None/Minimal pain - not affecting QOL     Fatigue - No flowsheet data found. Distress - No flowsheet data found. Physical Exam  Vitals reviewed. Exam conducted with a chaperone present. Constitutional:       General: She is not in acute distress. Appearance: Normal appearance. She is normal weight. She is not ill-appearing or toxic-appearing. HENT:      Head: Normocephalic and atraumatic. Nose: Nose normal.      Mouth/Throat:      Mouth: Mucous membranes are moist.   Eyes:      General: No scleral icterus. Extraocular Movements: Extraocular movements intact. Conjunctiva/sclera: Conjunctivae normal.      Pupils: Pupils are equal, round, and reactive to light. Cardiovascular:      Rate and Rhythm: Normal rate and regular rhythm. Heart sounds: No murmur heard. Pulmonary:      Effort: Pulmonary effort is normal. No respiratory distress. Breath sounds: Normal breath sounds. No wheezing or rales.    Chest:   Breasts: Right: No axillary adenopathy or supraclavicular adenopathy. Left: No axillary adenopathy or supraclavicular adenopathy. Comments: No mass/lump   No axillary LAD   Abdominal:      General: There is no distension. Palpations: Abdomen is soft. Tenderness: There is no abdominal tenderness. There is no guarding. Musculoskeletal:         General: Normal range of motion. Cervical back: Normal range of motion. Right lower leg: No edema. Left lower leg: No edema. Lymphadenopathy:      Cervical: No cervical adenopathy. Upper Body:      Right upper body: No supraclavicular or axillary adenopathy. Left upper body: No supraclavicular or axillary adenopathy. Skin:     General: Skin is warm and dry. Coloration: Skin is not jaundiced or pale. Findings: No rash. Neurological:      General: No focal deficit present. Mental Status: She is alert and oriented to person, place, and time. Gait: Gait normal.   Psychiatric:         Behavior: Behavior normal.         Thought Content:  Thought content normal.        Labs:  Recent Results (from the past 168 hour(s))   CBC with Auto Differential    Collection Time: 08/15/22 11:55 AM   Result Value Ref Range    WBC 4.8 4.3 - 11.1 K/uL    RBC 4.68 4.05 - 5.2 M/uL    Hemoglobin 14.2 11.7 - 15.4 g/dL    Hematocrit 43.7 35.8 - 46.3 %    MCV 93.4 79.6 - 97.8 FL    MCH 30.3 26.1 - 32.9 PG    MCHC 32.5 31.4 - 35.0 g/dL    RDW 13.0 11.9 - 14.6 %    Platelets 834 953 - 776 K/uL    MPV 9.7 9.4 - 12.3 FL    nRBC 0.00 0.0 - 0.2 K/uL    Seg Neutrophils 43 43 - 78 %    Lymphocytes 35 13 - 44 %    Monocytes 16 (H) 4.0 - 12.0 %    Eosinophils % 5 0.5 - 7.8 %    Basophils 1 0.0 - 2.0 %    Immature Granulocytes 0 0.0 - 5.0 %    Segs Absolute 2.1 1.7 - 8.2 K/UL    Absolute Lymph # 1.7 0.5 - 4.6 K/UL    Absolute Mono # 0.8 0.1 - 1.3 K/UL    Absolute Eos # 0.3 0.0 - 0.8 K/UL    Basophils Absolute 0.0 0.0 - 0.2 K/UL    Absolute Immature Granulocyte 0.0 0.0 - 0.5 K/UL    Differential Type AUTOMATED     Comprehensive Metabolic Panel    Collection Time: 08/15/22 11:55 AM   Result Value Ref Range    Sodium 137 136 - 145 mmol/L    Potassium 4.3 3.5 - 5.1 mmol/L    Chloride 106 98 - 107 mmol/L    CO2 27 21 - 32 mmol/L    Anion Gap 4 (L) 7 - 16 mmol/L    Glucose 96 65 - 100 mg/dL    BUN 36 (H) 8 - 23 MG/DL    Creatinine 0.90 0.6 - 1.0 MG/DL    GFR African American >60 >60 ml/min/1.73m2    GFR Non- >60 >60 ml/min/1.73m2    Calcium 9.7 8.3 - 10.4 MG/DL    Total Bilirubin 0.6 0.2 - 1.1 MG/DL    ALT 19 12 - 65 U/L    AST 17 15 - 37 U/L    Alk Phosphatase 95 50 - 136 U/L    Total Protein 7.3 6.3 - 8.2 g/dL    Albumin 4.1 3.2 - 4.6 g/dL    Globulin 3.2 2.3 - 3.5 g/dL    Albumin/Globulin Ratio 1.3 1.2 - 3.5         Imaging: reviewed     Pathology:                                                                                                                ASSESSMENT:     Diagnosis Orders   1. Malignant neoplasm of breast in female, estrogen receptor positive, unspecified laterality, unspecified site of breast ([facility] [address]  FAITH [facility]SHALL DIGITAL SCREEN UNI LEFT    CBC with Auto Differential    Comprehensive Metabolic Panel      2. Encounter for screening mammogram for malignant neoplasm of breast   FAITH MARSHALL DIGITAL SCREEN UNI LEFT           Mrs Dylon Mckee is here to follow up. 1. Right breast IDC with lobular features; s/p core bx, low grade, LVI and in situ not identified.  %, IN 65% and Her2 negative (+1)   S/p bx of a benign lesion seen on MRI   S/p lumpectomy with DCIS <2mm margins; s/p re-excision of margins with DCIS at <1mm   Subsequently underwent mastectomy in 5/2019 - with additional residual invasive ductal carcinoma - pT1a - no LN were sampled per pt's wishes/age   Hx of right breast cancer (close to current location) in 2007 s/p lumpectomy and XRT   - was on anastrozole - tolerating letrozole better       - on letrozole - refilled (omitted a few wks)   - vaginal dryness, can use Luvena   - DEXA osteopenia - can use Prolia 60mg subq q6mo while on AI; SE discussed and pt will review with her dentist as needs prior clearance. - advised on sunscreen use   - mammogram 12/2021 - Small cysts in the retroareolar left breast.  No evidence of malignancy in the left breast.  Screening mammogram recommended in one year - 12/2022       RTC in 3-4mo or sooner as needed       MDM  Number of Diagnoses or Management Options  Encounter for screening mammogram for malignant neoplasm of breast : new, needed workup     Amount and/or Complexity of Data Reviewed  Clinical lab tests: ordered and reviewed  Tests in the radiology section of CPT®: ordered and reviewed  Tests in the medicine section of CPT®: ordered  Review and summarize past medical records: yes  Independent visualization of images, tracings, or specimens: yes    Risk of Complications, Morbidity, and/or Mortality  Presenting problems: moderate  Diagnostic procedures: low  Management options: moderate        Lab studies and imaging studies were personally reviewed. Pertinent old records were reviewed. Historical:   - we discussed the pathophysiology of breast cancer, staging, and the importance of receptor status in terms of treatment options. We then reviewed her medical history as well as oncologic history, recent imaging and pathology in details. At this  time, she is wanting to proceed with lumpectomy. I do not have her pathology results from 2007, but she thinks she underwent SLNBx in 2007. At this time, she'd like to avoid any additional invasive procedures. Will proceed with breast MRI. She  will not agree to axillary dissection as she fears potential risk of lymphedema. She will be a candidate for AI therapy due to receptor status if she chooses to proceed. She is unsure at this time.   AI therapy can result in hot flashes, HTN, angina,  swelling, fatigue, bone thinning leading to osteoporosis/fractures, joint stiffness, N/V, hair thinning, weight changes, thrombosis and worsening depression to name a few. Of note, she was previously treated with Aromasin but due to joint stiffness  and intolerability (after 3 days) she was switched and took Tamoxifen for 5 years until 2012. Would consider anastrozole or exemestane.     - she saw Dr Alec Del Toro who at this point recommended mastectomy. We discussed the pathology in great details today and also the NCCN guidelines regarding margins with DCIS. She has multifocal DCIS. The invasive tumor has been resected. It has been  >10 years since last breast cancer. Recommend OncotypeDx testing on new lesion as may not be a recurrence but a new primary breast cancer. Refer to genetics, XRT (previous hx of XRT to same breast) and will try to get OncotypeDx. I discussed the case  with Dr Alec Del Toro after today's visit with the pt.   7/2019 - -previously discussed her pathology in details. She is aware she had residual invasive disease at mastectomy. We also reviewed her OncotypeDx scoring. Her score is low at 7 and she is not a candidate for chemotherapy. Risk of recurrence increases  with number of LN involved, however, she did not undergo LN sampling. She understands that we are unable to tell her with certainty if her LNs are negative. She also understands that her risk of recurrence is not as predictable. She VU. At this time,  she is recommended to proceed with anti-hormonal therapy. Anastrozole can cause hot flashes, HTN, angina, swelling, fatigue, bone thinning leading to osteoporosis/fractures, joint stiffness, N/V, hair thinning, weight changes, thrombosis and worsening  depression to name a few. - requested referral to GYN    All questions were asked and answered to the best of my ability. The patient verbalized understanding and agrees with the plan above.             [place] [facility]) Alon Mathews, [address] [facility]

## 2022-08-15 ENCOUNTER — OFFICE VISIT (OUTPATIENT)
Dept: ONCOLOGY | Age: 87
End: 2022-08-15
Payer: MEDICARE

## 2022-08-15 ENCOUNTER — HOSPITAL ENCOUNTER (OUTPATIENT)
Dept: LAB | Age: 87
Discharge: HOME | End: 2022-08-18
Payer: MEDICARE

## 2022-08-15 VITALS
SYSTOLIC BLOOD PRESSURE: 131 MMHG | OXYGEN SATURATION: 98 % | WEIGHT: 147.3 LBS | HEART RATE: 60 BPM | BODY MASS INDEX: 26.1 KG/M2 | RESPIRATION RATE: 22 BRPM | HEIGHT: 63 IN | DIASTOLIC BLOOD PRESSURE: 74 MMHG | TEMPERATURE: 97.7 F

## 2022-08-15 DIAGNOSIS — Z12.31 ENCOUNTER FOR SCREENING MAMMOGRAM FOR MALIGNANT NEOPLASM OF BREAST: ICD-10-CM

## 2022-08-15 DIAGNOSIS — Z79.811 AROMATASE INHIBITOR USE: ICD-10-CM

## 2022-08-15 DIAGNOSIS — Z17.0 MALIGNANT NEOPLASM OF BREAST IN FEMALE, ESTROGEN RECEPTOR POSITIVE, UNSPECIFIED LATERALITY, UNSPECIFIED SITE OF BREAST (HCC): ICD-10-CM

## 2022-08-15 DIAGNOSIS — C50.919 MALIGNANT NEOPLASM OF BREAST IN FEMALE, ESTROGEN RECEPTOR POSITIVE, UNSPECIFIED LATERALITY, UNSPECIFIED SITE OF BREAST (HCC): Primary | ICD-10-CM

## 2022-08-15 DIAGNOSIS — Z90.11 S/P RIGHT MASTECTOMY: ICD-10-CM

## 2022-08-15 DIAGNOSIS — Z17.0 MALIGNANT NEOPLASM OF BREAST IN FEMALE, ESTROGEN RECEPTOR POSITIVE, UNSPECIFIED LATERALITY, UNSPECIFIED SITE OF BREAST (HCC): Primary | ICD-10-CM

## 2022-08-15 DIAGNOSIS — C50.919 MALIGNANT NEOPLASM OF BREAST IN FEMALE, ESTROGEN RECEPTOR POSITIVE, UNSPECIFIED LATERALITY, UNSPECIFIED SITE OF BREAST (HCC): ICD-10-CM

## 2022-08-15 LAB
ALBUMIN SERPL-MCNC: 4.1 G/DL (ref 3.2–4.6)
ALBUMIN/GLOB SERPL: 1.3 {RATIO} (ref 1.2–3.5)
ALP SERPL-CCNC: 95 U/L (ref 50–136)
ALT SERPL-CCNC: 19 U/L (ref 12–65)
ANION GAP SERPL CALC-SCNC: 4 MMOL/L (ref 7–16)
AST SERPL-CCNC: 17 U/L (ref 15–37)
BASOPHILS # BLD: 0 K/UL (ref 0–0.2)
BASOPHILS NFR BLD: 1 % (ref 0–2)
BILIRUB SERPL-MCNC: 0.6 MG/DL (ref 0.2–1.1)
BUN SERPL-MCNC: 36 MG/DL (ref 8–23)
CALCIUM SERPL-MCNC: 9.7 MG/DL (ref 8.3–10.4)
CHLORIDE SERPL-SCNC: 106 MMOL/L (ref 98–107)
CO2 SERPL-SCNC: 27 MMOL/L (ref 21–32)
CREAT SERPL-MCNC: 0.9 MG/DL (ref 0.6–1)
DIFFERENTIAL METHOD BLD: ABNORMAL
EOSINOPHIL # BLD: 0.3 K/UL (ref 0–0.8)
EOSINOPHIL NFR BLD: 5 % (ref 0.5–7.8)
ERYTHROCYTE [DISTWIDTH] IN BLOOD BY AUTOMATED COUNT: 13 % (ref 11.9–14.6)
GLOBULIN SER CALC-MCNC: 3.2 G/DL (ref 2.3–3.5)
GLUCOSE SERPL-MCNC: 96 MG/DL (ref 65–100)
HCT VFR BLD AUTO: 43.7 % (ref 35.8–46.3)
HGB BLD-MCNC: 14.2 G/DL (ref 11.7–15.4)
IMM GRANULOCYTES # BLD AUTO: 0 K/UL (ref 0–0.5)
IMM GRANULOCYTES NFR BLD AUTO: 0 % (ref 0–5)
LYMPHOCYTES # BLD: 1.7 K/UL (ref 0.5–4.6)
LYMPHOCYTES NFR BLD: 35 % (ref 13–44)
MCH RBC QN AUTO: 30.3 PG (ref 26.1–32.9)
MCHC RBC AUTO-ENTMCNC: 32.5 G/DL (ref 31.4–35)
MCV RBC AUTO: 93.4 FL (ref 79.6–97.8)
MONOCYTES # BLD: 0.8 K/UL (ref 0.1–1.3)
MONOCYTES NFR BLD: 16 % (ref 4–12)
NEUTS SEG # BLD: 2.1 K/UL (ref 1.7–8.2)
NEUTS SEG NFR BLD: 43 % (ref 43–78)
NRBC # BLD: 0 K/UL (ref 0–0.2)
PLATELET # BLD AUTO: 226 K/UL (ref 150–450)
PMV BLD AUTO: 9.7 FL (ref 9.4–12.3)
POTASSIUM SERPL-SCNC: 4.3 MMOL/L (ref 3.5–5.1)
PROT SERPL-MCNC: 7.3 G/DL (ref 6.3–8.2)
RBC # BLD AUTO: 4.68 M/UL (ref 4.05–5.2)
SODIUM SERPL-SCNC: 137 MMOL/L (ref 136–145)
WBC # BLD AUTO: 4.8 K/UL (ref 4.3–11.1)

## 2022-08-15 PROCEDURE — 1090F PRES/ABSN URINE INCON ASSESS: CPT | Performed by: INTERNAL MEDICINE

## 2022-08-15 PROCEDURE — G8417 CALC BMI ABV UP PARAM F/U: HCPCS | Performed by: INTERNAL MEDICINE

## 2022-08-15 PROCEDURE — G8399 PT W/DXA RESULTS DOCUMENT: HCPCS | Performed by: INTERNAL MEDICINE

## 2022-08-15 PROCEDURE — 1036F TOBACCO NON-USER: CPT | Performed by: INTERNAL MEDICINE

## 2022-08-15 PROCEDURE — 85025 COMPLETE CBC W/AUTO DIFF WBC: CPT

## 2022-08-15 PROCEDURE — 80053 COMPREHEN METABOLIC PANEL: CPT

## 2022-08-15 PROCEDURE — 36415 COLL VENOUS BLD VENIPUNCTURE: CPT

## 2022-08-15 PROCEDURE — G8427 DOCREV CUR MEDS BY ELIG CLIN: HCPCS | Performed by: INTERNAL MEDICINE

## 2022-08-15 PROCEDURE — 99214 OFFICE O/P EST MOD 30 MIN: CPT | Performed by: INTERNAL MEDICINE

## 2022-08-15 PROCEDURE — 1123F ACP DISCUSS/DSCN MKR DOCD: CPT | Performed by: INTERNAL MEDICINE

## 2022-08-15 RX ORDER — LETROZOLE 2.5 MG/1
2.5 TABLET, FILM COATED ORAL DAILY
Qty: 90 TABLET | Refills: 3 | Status: SHIPPED | OUTPATIENT
Start: 2022-08-15

## 2022-08-15 ASSESSMENT — PATIENT HEALTH QUESTIONNAIRE - PHQ9
SUM OF ALL RESPONSES TO PHQ QUESTIONS 1-9: 2
SUM OF ALL RESPONSES TO PHQ9 QUESTIONS 1 & 2: 2
2. FEELING DOWN, DEPRESSED OR HOPELESS: 1
1. LITTLE INTEREST OR PLEASURE IN DOING THINGS: 1
SUM OF ALL RESPONSES TO PHQ QUESTIONS 1-9: 2

## 2022-08-15 NOTE — PATIENT INSTRUCTIONS
Patient Instructions from Today's Visit    Reason for Visit:  Follow up visit for breast cancer      Plan:    Continue letrozole. Will send in refill. Mammogram due in December. Jacquie Steveer will call to arrange.     Follow Up:  - 4-6 months    Recent Lab Results:  Hospital Outpatient Visit on 08/15/2022   Component Date Value Ref Range Status    WBC 08/15/2022 4.8  4.3 - 11.1 K/uL Final    RBC 08/15/2022 4.68  4.05 - 5.2 M/uL Final    Hemoglobin 08/15/2022 14.2  11.7 - 15.4 g/dL Final    Hematocrit 08/15/2022 43.7  35.8 - 46.3 % Final    MCV 08/15/2022 93.4  79.6 - 97.8 FL Final    MCH 08/15/2022 30.3  26.1 - 32.9 PG Final    MCHC 08/15/2022 32.5  31.4 - 35.0 g/dL Final    RDW 08/15/2022 13.0  11.9 - 14.6 % Final    Platelets 77/44/1269 226  150 - 450 K/uL Final    MPV 08/15/2022 9.7  9.4 - 12.3 FL Final    nRBC 08/15/2022 0.00  0.0 - 0.2 K/uL Final    **Note: Absolute NRBC parameter is now reported with Hemogram**    Seg Neutrophils 08/15/2022 43  43 - 78 % Final    Lymphocytes 08/15/2022 35  13 - 44 % Final    Monocytes 08/15/2022 16 (A) 4.0 - 12.0 % Final    Eosinophils % 08/15/2022 5  0.5 - 7.8 % Final    Basophils 08/15/2022 1  0.0 - 2.0 % Final    Immature Granulocytes 08/15/2022 0  0.0 - 5.0 % Final    Segs Absolute 08/15/2022 2.1  1.7 - 8.2 K/UL Final    Absolute Lymph # 08/15/2022 1.7  0.5 - 4.6 K/UL Final    Absolute Mono # 08/15/2022 0.8  0.1 - 1.3 K/UL Final    Absolute Eos # 08/15/2022 0.3  0.0 - 0.8 K/UL Final    Basophils Absolute 08/15/2022 0.0  0.0 - 0.2 K/UL Final    Absolute Immature Granulocyte 08/15/2022 0.0  0.0 - 0.5 K/UL Final    Differential Type 08/15/2022 AUTOMATED    Final    Sodium 08/15/2022 137  136 - 145 mmol/L Final    Potassium 08/15/2022 4.3  3.5 - 5.1 mmol/L Final    Chloride 08/15/2022 106  98 - 107 mmol/L Final    CO2 08/15/2022 27  21 - 32 mmol/L Final    Anion Gap 08/15/2022 4 (A) 7 - 16 mmol/L Final    Glucose 08/15/2022 96  65 - 100 mg/dL Final    BUN 08/15/2022 36 (A) 8 - 23 MG/DL Final    Creatinine 08/15/2022 0.90  0.6 - 1.0 MG/DL Final    GFR  08/15/2022 >60  >60 ml/min/1.73m2 Final    GFR Non- 08/15/2022 >60  >60 ml/min/1.73m2 Final    Comment:      Estimated GFR is calculated using the Modification of Diet in Renal Disease (MDRD) Study equation, reported for both  Americans (GFRAA) and non- Americans (GFRNA), and normalized to 1.73m2 body surface area. The physician must decide which value applies to the patient. The MDRD study equation should only be used in individuals age 25 or older. It has not been validated for the following: pregnant women, patients with serious comorbid conditions,or on certain medications, or persons with extremes of body size, muscle mass, or nutritional status. Calcium 08/15/2022 9.7  8.3 - 10.4 MG/DL Final    Total Bilirubin 08/15/2022 0.6  0.2 - 1.1 MG/DL Final    ALT 08/15/2022 19  12 - 65 U/L Final    AST 08/15/2022 17  15 - 37 U/L Final    Alk Phosphatase 08/15/2022 95  50 - 136 U/L Final    Total Protein 08/15/2022 7.3  6.3 - 8.2 g/dL Final    Albumin 08/15/2022 4.1  3.2 - 4.6 g/dL Final    Globulin 08/15/2022 3.2  2.3 - 3.5 g/dL Final    Albumin/Globulin Ratio 08/15/2022 1.3  1.2 - 3.5   Final        Treatment Summary has been discussed and given to patient: n/a        -------------------------------------------------------------------------------------------------------------------  Please call our office at [phone] if you have any  of the following symptoms:   Fever of 100.5 or greater  Chills  Shortness of breath  Swelling or pain in one leg    After office hours an answering service is available and will contact a provider for emergencies or if you are experiencing any of the above symptoms. Patient did express an interest in My Chart. My Chart log in information explained on the after visit summary printout at the [facility] [address] desk.     Lisa Kumar RN

## 2022-08-16 PROBLEM — Z79.811 AROMATASE INHIBITOR USE: Status: ACTIVE | Noted: 2022-08-16

## 2022-08-16 ASSESSMENT — ENCOUNTER SYMPTOMS
WHEEZING: 0
VOICE CHANGE: 0
CHEST TIGHTNESS: 0
DIARRHEA: 0
TROUBLE SWALLOWING: 0
NAUSEA: 0
BLOOD IN STOOL: 0
SORE THROAT: 0
ABDOMINAL PAIN: 0
CONSTIPATION: 0
ABDOMINAL DISTENTION: 0
SCLERAL ICTERUS: 0
SHORTNESS OF BREATH: 0
HEMOPTYSIS: 0
VOMITING: 0

## 2022-11-29 ENCOUNTER — TRANSCRIBE ORDERS (OUTPATIENT)
Dept: SCHEDULING | Age: 87
End: 2022-11-29

## 2022-11-29 DIAGNOSIS — Z12.31 VISIT FOR SCREENING MAMMOGRAM: Primary | ICD-10-CM

## 2023-01-16 ENCOUNTER — OFFICE VISIT (OUTPATIENT)
Dept: ONCOLOGY | Age: 88
End: 2023-01-16
Payer: MEDICARE

## 2023-01-16 ENCOUNTER — HOSPITAL ENCOUNTER (OUTPATIENT)
Dept: LAB | Age: 88
Discharge: HOME | End: 2023-01-19
Payer: MEDICARE

## 2023-01-16 VITALS
BODY MASS INDEX: 25.78 KG/M2 | SYSTOLIC BLOOD PRESSURE: 136 MMHG | WEIGHT: 145.5 LBS | TEMPERATURE: 97.8 F | DIASTOLIC BLOOD PRESSURE: 72 MMHG | HEART RATE: 71 BPM | RESPIRATION RATE: 20 BRPM | OXYGEN SATURATION: 97 % | HEIGHT: 63 IN

## 2023-01-16 DIAGNOSIS — C50.919 MALIGNANT NEOPLASM OF BREAST IN FEMALE, ESTROGEN RECEPTOR POSITIVE, UNSPECIFIED LATERALITY, UNSPECIFIED SITE OF BREAST (HCC): ICD-10-CM

## 2023-01-16 DIAGNOSIS — Z17.0 MALIGNANT NEOPLASM OF BREAST IN FEMALE, ESTROGEN RECEPTOR POSITIVE, UNSPECIFIED LATERALITY, UNSPECIFIED SITE OF BREAST (HCC): ICD-10-CM

## 2023-01-16 DIAGNOSIS — Z79.811 AROMATASE INHIBITOR USE: ICD-10-CM

## 2023-01-16 DIAGNOSIS — C50.919 MALIGNANT NEOPLASM OF BREAST IN FEMALE, ESTROGEN RECEPTOR POSITIVE, UNSPECIFIED LATERALITY, UNSPECIFIED SITE OF BREAST (HCC): Primary | ICD-10-CM

## 2023-01-16 DIAGNOSIS — Z90.11 S/P RIGHT MASTECTOMY: ICD-10-CM

## 2023-01-16 DIAGNOSIS — Z17.0 MALIGNANT NEOPLASM OF BREAST IN FEMALE, ESTROGEN RECEPTOR POSITIVE, UNSPECIFIED LATERALITY, UNSPECIFIED SITE OF BREAST (HCC): Primary | ICD-10-CM

## 2023-01-16 LAB
ALBUMIN SERPL-MCNC: 4.1 G/DL (ref 3.2–4.6)
ALBUMIN/GLOB SERPL: 1.3 (ref 0.4–1.6)
ALP SERPL-CCNC: 90 U/L (ref 50–136)
ALT SERPL-CCNC: 21 U/L (ref 12–65)
ANION GAP SERPL CALC-SCNC: 5 MMOL/L (ref 2–11)
AST SERPL-CCNC: 21 U/L (ref 15–37)
BASOPHILS # BLD: 0.1 K/UL (ref 0–0.2)
BASOPHILS NFR BLD: 1 % (ref 0–2)
BILIRUB SERPL-MCNC: 0.7 MG/DL (ref 0.2–1.1)
BUN SERPL-MCNC: 30 MG/DL (ref 8–23)
CALCIUM SERPL-MCNC: 10.2 MG/DL (ref 8.3–10.4)
CHLORIDE SERPL-SCNC: 103 MMOL/L (ref 101–110)
CO2 SERPL-SCNC: 30 MMOL/L (ref 21–32)
CREAT SERPL-MCNC: 0.8 MG/DL (ref 0.6–1)
DIFFERENTIAL METHOD BLD: ABNORMAL
EOSINOPHIL # BLD: 0.3 K/UL (ref 0–0.8)
EOSINOPHIL NFR BLD: 5 % (ref 0.5–7.8)
ERYTHROCYTE [DISTWIDTH] IN BLOOD BY AUTOMATED COUNT: 13.1 % (ref 11.9–14.6)
GLOBULIN SER CALC-MCNC: 3.2 G/DL (ref 2.8–4.5)
GLUCOSE SERPL-MCNC: 92 MG/DL (ref 65–100)
HCT VFR BLD AUTO: 41.6 % (ref 35.8–46.3)
HGB BLD-MCNC: 13.5 G/DL (ref 11.7–15.4)
IMM GRANULOCYTES # BLD AUTO: 0 K/UL (ref 0–0.5)
IMM GRANULOCYTES NFR BLD AUTO: 1 % (ref 0–5)
LYMPHOCYTES # BLD: 1.7 K/UL (ref 0.5–4.6)
LYMPHOCYTES NFR BLD: 27 % (ref 13–44)
MCH RBC QN AUTO: 29.7 PG (ref 26.1–32.9)
MCHC RBC AUTO-ENTMCNC: 32.5 G/DL (ref 31.4–35)
MCV RBC AUTO: 91.6 FL (ref 82–102)
MONOCYTES # BLD: 0.8 K/UL (ref 0.1–1.3)
MONOCYTES NFR BLD: 13 % (ref 4–12)
NEUTS SEG # BLD: 3.3 K/UL (ref 1.7–8.2)
NEUTS SEG NFR BLD: 53 % (ref 43–78)
NRBC # BLD: 0 K/UL (ref 0–0.2)
PLATELET # BLD AUTO: 219 K/UL (ref 150–450)
PMV BLD AUTO: 9.8 FL (ref 9.4–12.3)
POTASSIUM SERPL-SCNC: 4.1 MMOL/L (ref 3.5–5.1)
PROT SERPL-MCNC: 7.3 G/DL (ref 6.3–8.2)
RBC # BLD AUTO: 4.54 M/UL (ref 4.05–5.2)
SODIUM SERPL-SCNC: 138 MMOL/L (ref 133–143)
WBC # BLD AUTO: 6.2 K/UL (ref 4.3–11.1)

## 2023-01-16 PROCEDURE — G8484 FLU IMMUNIZE NO ADMIN: HCPCS | Performed by: NURSE PRACTITIONER

## 2023-01-16 PROCEDURE — G8427 DOCREV CUR MEDS BY ELIG CLIN: HCPCS | Performed by: NURSE PRACTITIONER

## 2023-01-16 PROCEDURE — 99214 OFFICE O/P EST MOD 30 MIN: CPT | Performed by: NURSE PRACTITIONER

## 2023-01-16 PROCEDURE — 1090F PRES/ABSN URINE INCON ASSESS: CPT | Performed by: NURSE PRACTITIONER

## 2023-01-16 PROCEDURE — 1036F TOBACCO NON-USER: CPT | Performed by: NURSE PRACTITIONER

## 2023-01-16 PROCEDURE — 1123F ACP DISCUSS/DSCN MKR DOCD: CPT | Performed by: NURSE PRACTITIONER

## 2023-01-16 PROCEDURE — 36415 COLL VENOUS BLD VENIPUNCTURE: CPT

## 2023-01-16 PROCEDURE — 85025 COMPLETE CBC W/AUTO DIFF WBC: CPT

## 2023-01-16 PROCEDURE — G8417 CALC BMI ABV UP PARAM F/U: HCPCS | Performed by: NURSE PRACTITIONER

## 2023-01-16 PROCEDURE — 80053 COMPREHEN METABOLIC PANEL: CPT

## 2023-01-16 PROCEDURE — G8399 PT W/DXA RESULTS DOCUMENT: HCPCS | Performed by: NURSE PRACTITIONER

## 2023-01-16 ASSESSMENT — ENCOUNTER SYMPTOMS
ABDOMINAL DISTENTION: 0
SHORTNESS OF BREATH: 0
SCLERAL ICTERUS: 0
CHEST TIGHTNESS: 0
NAUSEA: 0
VOMITING: 0
TROUBLE SWALLOWING: 0
VOICE CHANGE: 0
HEMOPTYSIS: 0
CONSTIPATION: 0
ABDOMINAL PAIN: 0
SORE THROAT: 0
BLOOD IN STOOL: 0
DIARRHEA: 0
WHEEZING: 0

## 2023-01-16 ASSESSMENT — PATIENT HEALTH QUESTIONNAIRE - PHQ9
2. FEELING DOWN, DEPRESSED OR HOPELESS: 1
SUM OF ALL RESPONSES TO PHQ QUESTIONS 1-9: 1
SUM OF ALL RESPONSES TO PHQ QUESTIONS 1-9: 1
1. LITTLE INTEREST OR PLEASURE IN DOING THINGS: 0
SUM OF ALL RESPONSES TO PHQ QUESTIONS 1-9: 1
SUM OF ALL RESPONSES TO PHQ9 QUESTIONS 1 & 2: 1
SUM OF ALL RESPONSES TO PHQ QUESTIONS 1-9: 1

## 2023-01-16 NOTE — PROGRESS NOTES
3 [facility] Hematology and Oncology: Established patient - follow up     Chief Complaint   Patient presents with    Follow-up     Diagnosis: breast cancer    History of Present Illness:  Ms. Yvon Knowles is a 82 y.o. female who presents today for management of Ms. Yvon Knowles is a 82 y.o. female who presents today in referral from Dr. Darreld Ahumada for consultation regarding breast cancer. The past medical history is significant for HTN, constipation, depression, knee replacement,  s/p hysterectomy, bladder repair. She underwent mammogram on 12/12/18 that showed superficial right breast mass at 5:00 6cm from the nipple. 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 She met with Dr Conti Both and after extensive discussion she decided that she'd like to undergo lumpectomy. She has hx of previous breast surgery with lumpectomy and XRT in 2007. She thinks she had a SLNx in 2007. She is here  to discuss further tx options. MRI showed additional site of concern and she underwent bx of the breast c/w intraductal hyperplasia. She then underwent a partial mastectomy with close DCIS margins. Resection revealed margins <1mm with DCIS. Social Hx: , non-smoker, drinks about 7 glasses wine/wk, no known exposures   Family Hx: maternal aunt breast cancer at 28, mat aunt 54 breast cancer, son battled metastatic GIST from 33-48. She returns today with her  for follow up. Doing well. She denies any pain. 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 Appetite and energy level are good. + vaginal dryness - using Luvena. She denies any other concerns or complaints. Conidering trying letrozole to see if better tolerated in terms of joint aches.   Of note, had a couple of 2 week breaks from anastrozole due to SE. Back on it. She returns today for routine follow up on Letrozole. She is tolerating treatment very well. There are no GI or bowel complaints. Appetite is good and weight is stable. Energy level is good and she remains very active. There is no cough, shortness of breath, or edema. Denies any hot flashes. There are ongoing arthralgias that are minimized due to her regular physical activity, plays pickle ball, tennis, etc. She denies any breast changes or concerns. Of note, she is caring for her  who has severe chronic pain. Chronological Events:   9/2007 - [facility] - s/p lumpectomy and XRT for right breast mass - Dr Alejandro Angelo   12/12/18 mammogram - right breast 5:00, 6 cm from nipple, ill defined margins   12/17/18 core biopsy right breast: IDC with lobular features, low grade, %PR65% and her2 carolyn negative   12/28/18 Dr Serena Galarza consultation   1/10/19 oncology consultation    MRI   2/14/19 bx   Partial mastectomy - close DCIS margins, <1mm   Resection of margins - <2mm for DCIS   3/21/19 follow up - discussion of pathology and imaging; Rec OncotypeDX, genetics consult and XRT consult   4/10/19 XRT consultation - Dr Malika Aldridge (no XRT offered)   4/18/19 Dr Serena Glaarza f/u   5/15/19 right mastectomy   6/7/19 f/u reviewed path from mastectomy in details, anastrozole   6/25/19 right chest wound/mastectomy site debridement   7/9/19 follow up, has not started AI   8/8/19 f/u, UTI on Bactrim; discussed lubricants, AI   10/1/19 DEXA - osteopenia 15%/5%   11/7/19 f/u doing well on AI, wbc 4.8, Hb 13.4, plts 229, requested referral to new GYN   3/12/20 f/u - doing well; considering letrozole due to joint aches.     8/2022 pt back for FU, last seen in 2020; wishes to c/w AI - refills   1/16/23 FU on letrozole, tolerating well      Family History   Problem Relation Age of Onset    Breast Cancer Maternal Aunt 28    Stroke Mother Heart Attack Father     Cancer Sister     Breast Cancer Sister     Heart Disease Mother       Social History     Socioeconomic History    Marital status:      Spouse name: None    Number of children: None    Years of education: None    Highest education level: None   Tobacco Use    Smoking status: Never    Smokeless tobacco: Never   Substance and Sexual Activity    Alcohol use: Yes     Alcohol/week: 7.0 standard drinks    Drug use: Never        Review of Systems   Constitutional:  Negative for appetite change, chills, diaphoresis, fatigue, fever and unexpected weight change. HENT:   Negative for hearing loss, mouth sores, nosebleeds, sore throat, trouble swallowing and voice change. Eyes:  Negative for icterus. Respiratory:  Negative for chest tightness, hemoptysis, shortness of breath and wheezing. Cardiovascular:  Negative for chest pain, leg swelling and palpitations. Gastrointestinal:  Negative for abdominal distention, abdominal pain, blood in stool, constipation, diarrhea, nausea and vomiting. Endocrine: Negative for hot flashes. Genitourinary:  Negative for difficulty urinating, frequency, vaginal bleeding and vaginal discharge. Musculoskeletal:  Positive for arthralgias. Negative for flank pain, gait problem and myalgias. Skin:  Negative for itching, rash and wound. Neurological:  Negative for dizziness, extremity weakness, gait problem, headaches and numbness. Psychiatric/Behavioral:  Negative for confusion, depression and sleep disturbance. The patient is not nervous/anxious.        Allergies   Allergen Reactions    Prednisone Other (See Comments)     Hyperactivity, insomnia, irritability     Past Medical History:   Diagnosis Date    Breast cancer ([facility] [address]     Depression     controlled with medication     H/O bladder infections     pt reports after bilateral TKA- had rajput once and didn't have rajput once but had \"issues\" after both surgeries    Hypertension     controlled with medication     Menopause     Osteoarthritis     Overactive bladder     controlled with medication     Radiation therapy complication 3247     Past Surgical History:   Procedure Laterality Date    BREAST BIOPSY Right 3/4/2019    RIGHT BREAST BIOPSY performed by Jair Garcia MD at [facility] Right 09/2007    RT Lumpectomy    BREAST LUMPECTOMY Right 09/05/2007    CATARACT REMOVAL Bilateral     HYSTERECTOMY (CERVIX STATUS UNKNOWN)  08/11/2011    with bladder and bowel lift    MASTECTOMY Right 5/15/2019    RIGHT TOTAL BREAST MASTECTOMY performed by Jair Garcia MD at [facility] Right 2/25/2019    RIGHT PARTIAL BREAST MASTECTOMY performed by Jair Garcia MD at [facility]    MRI BREAST BX USING DEVICE RIGHT Right 2/14/2019    MRI BREAST BX USING DEVICE RIGHT 2/14/2019 [facility] MRI    TOTAL KNEE ARTHROPLASTY Right     TOTAL KNEE ARTHROPLASTY Left 2018    US BREAST NEEDLE BIOPSY RIGHT Right 12/17/2018    US BREAST NEEDLE BIOPSY RIGHT 12/17/2018 [facility] MAMMO     Current Outpatient Medications   Medication Sig Dispense Refill    letrozole (FEMARA) 2.5 MG tablet Take 1 tablet by mouth in the morning. TAKE 1 TABLET BY MOUTH DAILYTAKE 1 TABLET BY MOUTH DAILY.  90 tablet 3    acetaminophen (TYLENOL) 500 MG tablet Take by mouth every 6 hours as needed      ascorbic acid (VITAMIN C) 500 MG tablet Take 500 mg by mouth daily      vitamin D 25 MCG (1000 UT) CAPS Take 1,000 Units by mouth daily      escitalopram (LEXAPRO) 5 MG tablet Take 5 mg by mouth daily      losartan (COZAAR) 50 MG tablet Take by mouth daily      magnesium oxide (MAG-OX) 400 (240 Mg) MG tablet Take 400 mg by mouth      Melatonin 5 MG CAPS Take by mouth      meloxicam (MOBIC) 15 MG tablet Take 15 mg by mouth daily      metoprolol succinate (TOPROL XL) 25 MG extended release tablet Take 25 mg by mouth daily      polyethylene glycol (GLYCOLAX) 17 GM/SCOOP powder Take 17 g by mouth daily       No current facility-administered medications for this visit. No flowsheet data found. OBJECTIVE:  /72   Pulse 71   Temp 97.8 °F (36.6 °C) (Oral)   Resp 20   Ht 5' 3\" (1.6 m)   Wt 145 lb 8 oz (66 kg)   SpO2 97%   BMI 25.77 kg/m²       ECOG PERFORMANCE STATUS - 0-Fully active, able to carry on all pre-disease performance without restriction. Pain - 0 - No pain/10. None/Minimal pain - not affecting QOL     Fatigue - No flowsheet data found. Distress - No flowsheet data found. Physical Exam  Vitals reviewed. Constitutional:       General: She is not in acute distress. Appearance: Normal appearance. She is normal weight. She is not ill-appearing or toxic-appearing. HENT:      Head: Normocephalic and atraumatic. Nose: Nose normal.      Mouth/Throat:      Mouth: Mucous membranes are moist.   Eyes:      General: No scleral icterus. Conjunctiva/sclera: Conjunctivae normal.   Cardiovascular:      Rate and Rhythm: Normal rate and regular rhythm. Heart sounds: No murmur heard. Pulmonary:      Effort: Pulmonary effort is normal. No respiratory distress. Breath sounds: Normal breath sounds. No wheezing or rales. Abdominal:      General: There is no distension. Palpations: Abdomen is soft. Tenderness: There is no abdominal tenderness. There is no guarding. Musculoskeletal:         General: Normal range of motion. Cervical back: Normal range of motion. Right lower leg: No edema. Left lower leg: No edema. Lymphadenopathy:      Cervical: No cervical adenopathy. Upper Body:      Right upper body: No supraclavicular or axillary adenopathy. Left upper body: No supraclavicular or axillary adenopathy. Skin:     General: Skin is warm and dry. Coloration: Skin is not jaundiced or pale. Findings: No rash. Neurological:      General: No focal deficit present.       Mental Status: She is alert and oriented to person, place, and time. Gait: Gait normal.   Psychiatric:         Behavior: Behavior normal.         Thought Content: Thought content normal.        Labs:  Recent Results (from the past 168 hour(s))   CBC with Auto Differential    Collection Time: 01/16/23  1:53 PM   Result Value Ref Range    WBC 6.2 4.3 - 11.1 K/uL    RBC 4.54 4.05 - 5.2 M/uL    Hemoglobin 13.5 11.7 - 15.4 g/dL    Hematocrit 41.6 35.8 - 46.3 %    MCV 91.6 82.0 - 102.0 FL    MCH 29.7 26.1 - 32.9 PG    MCHC 32.5 31.4 - 35.0 g/dL    RDW 13.1 11.9 - 14.6 %    Platelets 506 715 - 103 K/uL    MPV 9.8 9.4 - 12.3 FL    nRBC 0.00 0.0 - 0.2 K/uL    Seg Neutrophils 53 43 - 78 %    Lymphocytes 27 13 - 44 %    Monocytes 13 (H) 4.0 - 12.0 %    Eosinophils % 5 0.5 - 7.8 %    Basophils 1 0.0 - 2.0 %    Immature Granulocytes 1 0.0 - 5.0 %    Segs Absolute 3.3 1.7 - 8.2 K/UL    Absolute Lymph # 1.7 0.5 - 4.6 K/UL    Absolute Mono # 0.8 0.1 - 1.3 K/UL    Absolute Eos # 0.3 0.0 - 0.8 K/UL    Basophils Absolute 0.1 0.0 - 0.2 K/UL    Absolute Immature Granulocyte 0.0 0.0 - 0.5 K/UL    Differential Type AUTOMATED     Comprehensive Metabolic Panel    Collection Time: 01/16/23  1:53 PM   Result Value Ref Range    Sodium 138 133 - 143 mmol/L    Potassium 4.1 3.5 - 5.1 mmol/L    Chloride 103 101 - 110 mmol/L    CO2 30 21 - 32 mmol/L    Anion Gap 5 2 - 11 mmol/L    Glucose 92 65 - 100 mg/dL    BUN 30 (H) 8 - 23 MG/DL    Creatinine 0.80 0.6 - 1.0 MG/DL    Est, Glom Filt Rate >60 >60 ml/min/1.73m2    Calcium 10.2 8.3 - 10.4 MG/DL    Total Bilirubin 0.7 0.2 - 1.1 MG/DL    ALT 21 12 - 65 U/L    AST 21 15 - 37 U/L    Alk Phosphatase 90 50 - 136 U/L    Total Protein 7.3 6.3 - 8.2 g/dL    Albumin 4.1 3.2 - 4.6 g/dL    Globulin 3.2 2.8 - 4.5 g/dL    Albumin/Globulin Ratio 1.3 0.4 - 1.6         Imaging: reviewed     Pathology:                                                                                                                ASSESSMENT:     Diagnosis Orders   1. Malignant neoplasm of breast in female, estrogen receptor positive, unspecified laterality, unspecified site of breast ([facility] [address]        2. S/P right mastectomy        3. Aromatase inhibitor use             Mrs Rica Hernández is here to follow up. 1. Right breast IDC with lobular features; s/p core bx, low grade, LVI and in situ not identified. %, CA 65% and Her2 negative (+1)   S/p bx of a benign lesion seen on MRI   S/p lumpectomy with DCIS <2mm margins; s/p re-excision of margins with DCIS at <1mm   Subsequently underwent mastectomy in 5/2019 - with additional residual invasive ductal carcinoma - pT1a - no LN were sampled per pt's wishes/age   Hx of right breast cancer (close to current location) in 2007 s/p lumpectomy and XRT   - was on anastrozole - tolerating letrozole better       - on letrozole - tolerating well, continue daily    - vaginal dryness, can use Luvena   - DEXA osteopenia - does not desire use of Prolia, will continue on daily Ca+/vit D.    - mammogram 12/2021 - Small cysts in the retroareolar left breast.  No evidence of malignancy in the left breast.  Screening mammogram recommended in one year - scheduled for 1/27      RTC in 6mo or sooner as needed       MDM      Lab studies and imaging studies were personally reviewed. Pertinent old records were reviewed. Historical:   - we discussed the pathophysiology of breast cancer, staging, and the importance of receptor status in terms of treatment options. We then reviewed her medical history as well as oncologic history, recent imaging and pathology in details. At this  time, she is wanting to proceed with lumpectomy. I do not have her pathology results from 2007, but she thinks she underwent SLNBx in 2007. At this time, she'd like to avoid any additional invasive procedures. Will proceed with breast MRI. She  will not agree to axillary dissection as she fears potential risk of lymphedema.   She will be a candidate for AI therapy due to receptor status if she chooses to proceed. She is unsure at this time. AI therapy can result in hot flashes, HTN, angina,  swelling, fatigue, bone thinning leading to osteoporosis/fractures, joint stiffness, N/V, hair thinning, weight changes, thrombosis and worsening depression to name a few. Of note, she was previously treated with Aromasin but due to joint stiffness  and intolerability (after 3 days) she was switched and took Tamoxifen for 5 years until 2012. Would consider anastrozole or exemestane.     - she saw Dr Sushila Gant who at this point recommended mastectomy. We discussed the pathology in great details today and also the NCCN guidelines regarding margins with DCIS. She has multifocal DCIS. The invasive tumor has been resected. It has been  >10 years since last breast cancer. Recommend OncotypeDx testing on new lesion as may not be a recurrence but a new primary breast cancer. Refer to genetics, XRT (previous hx of XRT to same breast) and will try to get OncotypeDx. I discussed the case  with Dr Conti Both after today's visit with the pt.   7/2019 - -previously discussed her pathology in details. She is aware she had residual invasive disease at mastectomy. We also reviewed her OncotypeDx scoring. Her score is low at 7 and she is not a candidate for chemotherapy. Risk of recurrence increases  with number of LN involved, however, she did not undergo LN sampling. She understands that we are unable to tell her with certainty if her LNs are negative. She also understands that her risk of recurrence is not as predictable. She VU. At this time,  she is recommended to proceed with anti-hormonal therapy. Anastrozole can cause hot flashes, HTN, angina, swelling, fatigue, bone thinning leading to osteoporosis/fractures, joint stiffness, N/V, hair thinning, weight changes, thrombosis and worsening  depression to name a few.    - requested referral to GYN    All questions were asked and answered to the best of my ability. The patient verbalized understanding and agrees with the plan above.             Anni Pederson) 1973 [address] [place] [address] Hematology and Oncology  [address] [place] [address]  Office : [phone]  Fax : [phone]

## 2023-01-27 ENCOUNTER — HOSPITAL ENCOUNTER (OUTPATIENT)
Dept: MAMMOGRAPHY | Age: 88
End: 2023-01-27
Payer: MEDICARE

## 2023-01-27 DIAGNOSIS — Z17.0 MALIGNANT NEOPLASM OF BREAST IN FEMALE, ESTROGEN RECEPTOR POSITIVE, UNSPECIFIED LATERALITY, UNSPECIFIED SITE OF BREAST (HCC): ICD-10-CM

## 2023-01-27 DIAGNOSIS — Z12.31 ENCOUNTER FOR SCREENING MAMMOGRAM FOR MALIGNANT NEOPLASM OF BREAST: ICD-10-CM

## 2023-01-27 DIAGNOSIS — C50.919 MALIGNANT NEOPLASM OF BREAST IN FEMALE, ESTROGEN RECEPTOR POSITIVE, UNSPECIFIED LATERALITY, UNSPECIFIED SITE OF BREAST (HCC): ICD-10-CM

## 2023-01-27 PROCEDURE — 77067 SCR MAMMO BI INCL CAD: CPT

## 2023-07-13 DIAGNOSIS — Z17.0 MALIGNANT NEOPLASM OF BREAST IN FEMALE, ESTROGEN RECEPTOR POSITIVE, UNSPECIFIED LATERALITY, UNSPECIFIED SITE OF BREAST (HCC): Primary | ICD-10-CM

## 2023-07-13 DIAGNOSIS — C50.919 MALIGNANT NEOPLASM OF BREAST IN FEMALE, ESTROGEN RECEPTOR POSITIVE, UNSPECIFIED LATERALITY, UNSPECIFIED SITE OF BREAST (HCC): Primary | ICD-10-CM

## 2023-07-24 ENCOUNTER — TELEPHONE (OUTPATIENT)
Dept: RADIATION ONCOLOGY | Age: 88
End: 2023-07-24

## 2023-09-14 ENCOUNTER — TELEPHONE (OUTPATIENT)
Dept: ONCOLOGY | Age: 88
End: 2023-09-14

## 2023-09-14 RX ORDER — LETROZOLE 2.5 MG/1
2.5 TABLET, FILM COATED ORAL DAILY
Qty: 90 TABLET | Refills: 3 | Status: SHIPPED | OUTPATIENT
Start: 2023-09-14

## 2023-09-14 NOTE — TELEPHONE ENCOUNTER
Physician provider: Larry Ivy MD  Reason for today's call: refill  Last office visit:1/16/2023    Patient notified that their information will be routed to the [facility] clinical triage team for review. Patient is advised that they will receive a phone call from the triage department.        Medication Refill: Letrozole 2.5 Mg    Pharmacy: [facility] in [facility]

## 2023-09-14 NOTE — TELEPHONE ENCOUNTER
Medication Requested: Letrozole    Date last prescribed: 8/15/2022    Requested Pharmacy: [facility]    Action Taken: Chart reviewed. Refill sent. Patient notified.

## 2023-10-17 ASSESSMENT — ENCOUNTER SYMPTOMS
BLOOD IN STOOL: 0
DIARRHEA: 0
CHEST TIGHTNESS: 0
VOICE CHANGE: 0
SHORTNESS OF BREATH: 0
ABDOMINAL DISTENTION: 0
SORE THROAT: 0
NAUSEA: 0
CONSTIPATION: 0
HEMOPTYSIS: 0
TROUBLE SWALLOWING: 0
SCLERAL ICTERUS: 0
ABDOMINAL PAIN: 0
WHEEZING: 0
VOMITING: 0

## 2023-10-17 NOTE — PROGRESS NOTES
[facility] Hematology and Oncology: Established patient - follow up     Chief Complaint   Patient presents with    Follow-up     Diagnosis: breast cancer    History of Present Illness:  Ms. Norberto Parry is a 82 y.o. female who presents today for management of Ms. Norberto Parry is a 82 y.o. female who presents today in referral from Dr. Lj Andrews for consultation regarding breast cancer. The past medical history is significant for HTN, constipation, depression, knee replacement,  s/p hysterectomy, bladder repair. She underwent mammogram on 12/12/18 that showed superficial right breast mass at 5:00 6cm from the nipple. 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 She met with Dr Dyllan Morrow and after extensive discussion she decided that she'd like to undergo lumpectomy. She has hx of previous breast surgery with lumpectomy and XRT in 2007. She thinks she had a SLNx in 2007. She is here  to discuss further tx options. MRI showed additional site of concern and she underwent bx of the breast c/w intraductal hyperplasia. She then underwent a partial mastectomy with close DCIS margins. Resection revealed margins <1mm with DCIS. Social Hx: , non-smoker, drinks about 7 glasses wine/wk, no known exposures   Family Hx: maternal aunt breast cancer at 28, mat aunt 54 breast cancer, son battled metastatic GIST from 34-46. She returns today for routine follow up on Letrozole. She is tolerating treatment very well. There are no pulm, GI or bowel complaints. Appetite is good and weight is stable. Energy level is good and she remains very active. 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 Denies any hot flashes. She denies any breast changes or concerns. Mammo from Jan [facility]. No chest wall changes on the right today and no breast abn on the left. Labs reviewed.   Wishes to p/w BCI to lea

## 2023-10-23 ENCOUNTER — HOSPITAL ENCOUNTER (OUTPATIENT)
Dept: LAB | Age: 88
Discharge: HOME | End: 2023-10-26
Payer: MEDICARE

## 2023-10-23 ENCOUNTER — OFFICE VISIT (OUTPATIENT)
Dept: ONCOLOGY | Age: 88
End: 2023-10-23
Payer: MEDICARE

## 2023-10-23 VITALS
WEIGHT: 148 LBS | HEART RATE: 79 BPM | DIASTOLIC BLOOD PRESSURE: 76 MMHG | TEMPERATURE: 97.8 F | OXYGEN SATURATION: 96 % | BODY MASS INDEX: 26.22 KG/M2 | RESPIRATION RATE: 16 BRPM | SYSTOLIC BLOOD PRESSURE: 125 MMHG

## 2023-10-23 DIAGNOSIS — Z91.89 AT RISK FOR BONE DENSITY LOSS: ICD-10-CM

## 2023-10-23 DIAGNOSIS — C50.919 MALIGNANT NEOPLASM OF BREAST IN FEMALE, ESTROGEN RECEPTOR POSITIVE, UNSPECIFIED LATERALITY, UNSPECIFIED SITE OF BREAST (HCC): Primary | ICD-10-CM

## 2023-10-23 DIAGNOSIS — C50.919 MALIGNANT NEOPLASM OF BREAST IN FEMALE, ESTROGEN RECEPTOR POSITIVE, UNSPECIFIED LATERALITY, UNSPECIFIED SITE OF BREAST (HCC): ICD-10-CM

## 2023-10-23 DIAGNOSIS — Z17.0 MALIGNANT NEOPLASM OF BREAST IN FEMALE, ESTROGEN RECEPTOR POSITIVE, UNSPECIFIED LATERALITY, UNSPECIFIED SITE OF BREAST (HCC): ICD-10-CM

## 2023-10-23 DIAGNOSIS — Z12.31 ENCOUNTER FOR SCREENING MAMMOGRAM FOR MALIGNANT NEOPLASM OF BREAST: ICD-10-CM

## 2023-10-23 DIAGNOSIS — Z17.0 MALIGNANT NEOPLASM OF BREAST IN FEMALE, ESTROGEN RECEPTOR POSITIVE, UNSPECIFIED LATERALITY, UNSPECIFIED SITE OF BREAST (HCC): Primary | ICD-10-CM

## 2023-10-23 DIAGNOSIS — Z79.811 AROMATASE INHIBITOR USE: ICD-10-CM

## 2023-10-23 LAB
ALBUMIN SERPL-MCNC: 4.1 G/DL (ref 3.2–4.6)
ALBUMIN/GLOB SERPL: 1.2 (ref 0.4–1.6)
ALP SERPL-CCNC: 92 U/L (ref 50–136)
ALT SERPL-CCNC: 20 U/L (ref 12–65)
ANION GAP SERPL CALC-SCNC: 4 MMOL/L (ref 2–11)
AST SERPL-CCNC: 18 U/L (ref 15–37)
BASOPHILS # BLD: 0 K/UL (ref 0–0.2)
BASOPHILS NFR BLD: 1 % (ref 0–2)
BILIRUB SERPL-MCNC: 0.8 MG/DL (ref 0.2–1.1)
BUN SERPL-MCNC: 27 MG/DL (ref 8–23)
CALCIUM SERPL-MCNC: 9.5 MG/DL (ref 8.3–10.4)
CHLORIDE SERPL-SCNC: 104 MMOL/L (ref 101–110)
CO2 SERPL-SCNC: 30 MMOL/L (ref 21–32)
CREAT SERPL-MCNC: 0.9 MG/DL (ref 0.6–1)
DIFFERENTIAL METHOD BLD: NORMAL
EOSINOPHIL # BLD: 0.2 K/UL (ref 0–0.8)
EOSINOPHIL NFR BLD: 4 % (ref 0.5–7.8)
ERYTHROCYTE [DISTWIDTH] IN BLOOD BY AUTOMATED COUNT: 13 % (ref 11.9–14.6)
GLOBULIN SER CALC-MCNC: 3.4 G/DL (ref 2.8–4.5)
GLUCOSE SERPL-MCNC: 135 MG/DL (ref 65–100)
HCT VFR BLD AUTO: 43.4 % (ref 35.8–46.3)
HGB BLD-MCNC: 14.3 G/DL (ref 11.7–15.4)
IMM GRANULOCYTES # BLD AUTO: 0 K/UL (ref 0–0.5)
IMM GRANULOCYTES NFR BLD AUTO: 0 % (ref 0–5)
LYMPHOCYTES # BLD: 1.6 K/UL (ref 0.5–4.6)
LYMPHOCYTES NFR BLD: 30 % (ref 13–44)
MCH RBC QN AUTO: 30.4 PG (ref 26.1–32.9)
MCHC RBC AUTO-ENTMCNC: 32.9 G/DL (ref 31.4–35)
MCV RBC AUTO: 92.3 FL (ref 82–102)
MONOCYTES # BLD: 0.6 K/UL (ref 0.1–1.3)
MONOCYTES NFR BLD: 12 % (ref 4–12)
NEUTS SEG # BLD: 2.8 K/UL (ref 1.7–8.2)
NEUTS SEG NFR BLD: 53 % (ref 43–78)
NRBC # BLD: 0 K/UL (ref 0–0.2)
PLATELET # BLD AUTO: 212 K/UL (ref 150–450)
PMV BLD AUTO: 10 FL (ref 9.4–12.3)
POTASSIUM SERPL-SCNC: 3.7 MMOL/L (ref 3.5–5.1)
PROT SERPL-MCNC: 7.5 G/DL (ref 6.3–8.2)
RBC # BLD AUTO: 4.7 M/UL (ref 4.05–5.2)
SODIUM SERPL-SCNC: 138 MMOL/L (ref 133–143)
WBC # BLD AUTO: 5.2 K/UL (ref 4.3–11.1)

## 2023-10-23 PROCEDURE — G8427 DOCREV CUR MEDS BY ELIG CLIN: HCPCS | Performed by: INTERNAL MEDICINE

## 2023-10-23 PROCEDURE — 85025 COMPLETE CBC W/AUTO DIFF WBC: CPT

## 2023-10-23 PROCEDURE — G8484 FLU IMMUNIZE NO ADMIN: HCPCS | Performed by: INTERNAL MEDICINE

## 2023-10-23 PROCEDURE — G8417 CALC BMI ABV UP PARAM F/U: HCPCS | Performed by: INTERNAL MEDICINE

## 2023-10-23 PROCEDURE — 36415 COLL VENOUS BLD VENIPUNCTURE: CPT

## 2023-10-23 PROCEDURE — 1123F ACP DISCUSS/DSCN MKR DOCD: CPT | Performed by: INTERNAL MEDICINE

## 2023-10-23 PROCEDURE — 99214 OFFICE O/P EST MOD 30 MIN: CPT | Performed by: INTERNAL MEDICINE

## 2023-10-23 PROCEDURE — 1090F PRES/ABSN URINE INCON ASSESS: CPT | Performed by: INTERNAL MEDICINE

## 2023-10-23 PROCEDURE — 80053 COMPREHEN METABOLIC PANEL: CPT

## 2023-10-23 PROCEDURE — 1036F TOBACCO NON-USER: CPT | Performed by: INTERNAL MEDICINE

## 2023-10-23 ASSESSMENT — PATIENT HEALTH QUESTIONNAIRE - PHQ9
SUM OF ALL RESPONSES TO PHQ9 QUESTIONS 1 & 2: 0
SUM OF ALL RESPONSES TO PHQ QUESTIONS 1-9: 0
2. FEELING DOWN, DEPRESSED OR HOPELESS: 0
SUM OF ALL RESPONSES TO PHQ QUESTIONS 1-9: 0
1. LITTLE INTEREST OR PLEASURE IN DOING THINGS: 0
SUM OF ALL RESPONSES TO PHQ QUESTIONS 1-9: 0
SUM OF ALL RESPONSES TO PHQ QUESTIONS 1-9: 0

## 2023-10-23 NOTE — PATIENT INSTRUCTIONS
Patient Instructions from Today's Visit    Reason for Visit:  Follow Up    Diagnosis Information:  [email or web address]    Plan:  Labs reviewed. Symptoms reviewed. Mammogram due in January. You can call to schedule this at [phone]. We will run Breast Cancer Index testing on your tissue. Follow Up:  6 months.     Recent Lab Results:  Hospital Outpatient Visit on 10/23/2023   Component Date Value Ref Range Status    WBC 10/23/2023 5.2  4.3 - 11.1 K/uL Final    RBC 10/23/2023 4.70  4.05 - 5.2 M/uL Final    Hemoglobin 10/23/2023 14.3  11.7 - 15.4 g/dL Final    Hematocrit 10/23/2023 43.4  35.8 - 46.3 % Final    MCV 10/23/2023 92.3  82.0 - 102.0 FL Final    MCH 10/23/2023 30.4  26.1 - 32.9 PG Final    MCHC 10/23/2023 32.9  31.4 - 35.0 g/dL Final    RDW 10/23/2023 13.0  11.9 - 14.6 % Final    Platelets 77/73/0456 212  150 - 450 K/uL Final    MPV 10/23/2023 10.0  9.4 - 12.3 FL Final    nRBC 10/23/2023 0.00  0.0 - 0.2 K/uL Final    **Note: Absolute NRBC parameter is now reported with Hemogram**    Differential Type 10/23/2023 AUTOMATED    Final    Neutrophils % 10/23/2023 53  43 - 78 % Final    Lymphocytes % 10/23/2023 30  13 - 44 % Final    Monocytes % 10/23/2023 12  4.0 - 12.0 % Final    Eosinophils % 10/23/2023 4  0.5 - 7.8 % Final    Basophils % 10/23/2023 1  0.0 - 2.0 % Final    Immature Granulocytes 10/23/2023 0  0.0 - 5.0 % Final    Neutrophils Absolute 10/23/2023 2.8  1.7 - 8.2 K/UL Final    Lymphocytes Absolute 10/23/2023 1.6  0.5 - 4.6 K/UL Final    Monocytes Absolute 10/23/2023 0.6  0.1 - 1.3 K/UL Final    Eosinophils Absolute 10/23/2023 0.2  0.0 - 0.8 K/UL Final    Basophils Absolute 10/23/2023 0.0  0.0 - 0.2 K/UL Final    Absolute Immature Granulocyte 10/23/2023 0.0  0.0 - 0.5 K/UL Final         Treatment Summary has been discussed and given to patient:

## 2023-10-24 ENCOUNTER — CLINICAL DOCUMENTATION (OUTPATIENT)
Dept: CASE MANAGEMENT | Age: 88
End: 2023-10-24

## 2023-10-24 RX ORDER — LETROZOLE 2.5 MG/1
2.5 TABLET, FILM COATED ORAL EVERY MORNING
Qty: 90 TABLET | Refills: 3 | OUTPATIENT
Start: 2023-10-24

## 2023-11-17 RX ORDER — LETROZOLE 2.5 MG/1
TABLET, FILM COATED ORAL
Qty: 90 TABLET | OUTPATIENT
Start: 2023-11-17

## 2023-11-22 PROBLEM — Z12.31 ENCOUNTER FOR SCREENING MAMMOGRAM FOR MALIGNANT NEOPLASM OF BREAST: Status: RESOLVED | Noted: 2023-10-23 | Resolved: 2023-11-22

## 2024-02-13 ENCOUNTER — HOSPITAL ENCOUNTER (OUTPATIENT)
Dept: MAMMOGRAPHY | Age: 89
Discharge: HOME | End: 2024-02-16
Attending: INTERNAL MEDICINE
Payer: MEDICARE

## 2024-02-13 VITALS — HEIGHT: 64 IN | BODY MASS INDEX: 25.27 KG/M2 | WEIGHT: 148 LBS

## 2024-02-13 DIAGNOSIS — C50.919 MALIGNANT NEOPLASM OF BREAST IN FEMALE, ESTROGEN RECEPTOR POSITIVE, UNSPECIFIED LATERALITY, UNSPECIFIED SITE OF BREAST (HCC): ICD-10-CM

## 2024-02-13 DIAGNOSIS — Z17.0 MALIGNANT NEOPLASM OF BREAST IN FEMALE, ESTROGEN RECEPTOR POSITIVE, UNSPECIFIED LATERALITY, UNSPECIFIED SITE OF BREAST (HCC): ICD-10-CM

## 2024-02-13 DIAGNOSIS — Z12.31 ENCOUNTER FOR SCREENING MAMMOGRAM FOR MALIGNANT NEOPLASM OF BREAST: ICD-10-CM

## 2024-02-13 PROCEDURE — 77063 BREAST TOMOSYNTHESIS BI: CPT

## 2024-05-03 ENCOUNTER — TELEPHONE (OUTPATIENT)
Dept: ONCOLOGY | Age: 89
End: 2024-05-03

## 2024-05-07 ENCOUNTER — TELEPHONE (OUTPATIENT)
Dept: ONCOLOGY | Age: 89
End: 2024-05-07

## 2024-05-07 NOTE — TELEPHONE ENCOUNTER
Physician provider: Therese Morgan MD  Reason for today's call: RS appt  Last office visit:N/A    Patient notified that their information will be routed to the [facility] clinical triage team for review. Patient is advised that they will receive a phone call from the triage department. If symptoms worsen before receiving a call back, the patient has been advised to proceed to the nearest ED.      Pt called to RS the 5/6/24 appt she cancelled.  Pt would like a call back.  She'd like to get in as soon as she can.  Tuesdays in the afternoon work best for her.

## 2024-05-23 ENCOUNTER — OFFICE VISIT (OUTPATIENT)
Dept: ONCOLOGY | Age: 89
End: 2024-05-23
Payer: MEDICARE

## 2024-05-23 ENCOUNTER — HOSPITAL ENCOUNTER (OUTPATIENT)
Dept: LAB | Age: 89
Discharge: HOME | End: 2024-05-23
Payer: MEDICARE

## 2024-05-23 VITALS
HEIGHT: 64 IN | BODY MASS INDEX: 25.27 KG/M2 | HEART RATE: 70 BPM | DIASTOLIC BLOOD PRESSURE: 92 MMHG | WEIGHT: 148 LBS | OXYGEN SATURATION: 97 % | TEMPERATURE: 97.8 F | SYSTOLIC BLOOD PRESSURE: 142 MMHG | RESPIRATION RATE: 16 BRPM

## 2024-05-23 DIAGNOSIS — Z17.0 MALIGNANT NEOPLASM OF BREAST IN FEMALE, ESTROGEN RECEPTOR POSITIVE, UNSPECIFIED LATERALITY, UNSPECIFIED SITE OF BREAST (HCC): ICD-10-CM

## 2024-05-23 DIAGNOSIS — Z90.11 S/P RIGHT MASTECTOMY: ICD-10-CM

## 2024-05-23 DIAGNOSIS — C50.919 MALIGNANT NEOPLASM OF BREAST IN FEMALE, ESTROGEN RECEPTOR POSITIVE, UNSPECIFIED LATERALITY, UNSPECIFIED SITE OF BREAST (HCC): Primary | ICD-10-CM

## 2024-05-23 DIAGNOSIS — Z91.89 AT RISK FOR BONE DENSITY LOSS: ICD-10-CM

## 2024-05-23 DIAGNOSIS — C50.919 MALIGNANT NEOPLASM OF BREAST IN FEMALE, ESTROGEN RECEPTOR POSITIVE, UNSPECIFIED LATERALITY, UNSPECIFIED SITE OF BREAST (HCC): ICD-10-CM

## 2024-05-23 DIAGNOSIS — Z79.811 AROMATASE INHIBITOR USE: ICD-10-CM

## 2024-05-23 DIAGNOSIS — Z17.0 MALIGNANT NEOPLASM OF BREAST IN FEMALE, ESTROGEN RECEPTOR POSITIVE, UNSPECIFIED LATERALITY, UNSPECIFIED SITE OF BREAST (HCC): Primary | ICD-10-CM

## 2024-05-23 LAB
ALBUMIN SERPL-MCNC: 4.2 G/DL (ref 3.2–4.6)
ALBUMIN/GLOB SERPL: 1.4 (ref 1–1.9)
ALP SERPL-CCNC: 88 U/L (ref 35–104)
ALT SERPL-CCNC: 15 U/L (ref 12–65)
ANION GAP SERPL CALC-SCNC: 8 MMOL/L (ref 9–18)
AST SERPL-CCNC: 23 U/L (ref 15–37)
BASOPHILS # BLD: 0.1 K/UL (ref 0–0.2)
BASOPHILS NFR BLD: 1 % (ref 0–2)
BILIRUB SERPL-MCNC: 0.6 MG/DL (ref 0–1.2)
BUN SERPL-MCNC: 26 MG/DL (ref 8–23)
CALCIUM SERPL-MCNC: 10.5 MG/DL (ref 8.8–10.2)
CHLORIDE SERPL-SCNC: 104 MMOL/L (ref 98–107)
CO2 SERPL-SCNC: 30 MMOL/L (ref 20–28)
CREAT SERPL-MCNC: 0.81 MG/DL (ref 0.6–1.1)
DIFFERENTIAL METHOD BLD: NORMAL
EOSINOPHIL # BLD: 0.4 K/UL (ref 0–0.8)
EOSINOPHIL NFR BLD: 7 % (ref 0.5–7.8)
ERYTHROCYTE [DISTWIDTH] IN BLOOD BY AUTOMATED COUNT: 13.5 % (ref 11.9–14.6)
GLOBULIN SER CALC-MCNC: 2.9 G/DL (ref 2.3–3.5)
GLUCOSE SERPL-MCNC: 79 MG/DL (ref 70–99)
HCT VFR BLD AUTO: 44.6 % (ref 35.8–46.3)
HGB BLD-MCNC: 14 G/DL (ref 11.7–15.4)
IMM GRANULOCYTES # BLD AUTO: 0 K/UL (ref 0–0.5)
IMM GRANULOCYTES NFR BLD AUTO: 0 % (ref 0–5)
LYMPHOCYTES # BLD: 1.8 K/UL (ref 0.5–4.6)
LYMPHOCYTES NFR BLD: 31 % (ref 13–44)
MCH RBC QN AUTO: 29.5 PG (ref 26.1–32.9)
MCHC RBC AUTO-ENTMCNC: 31.4 G/DL (ref 31.4–35)
MCV RBC AUTO: 93.9 FL (ref 82–102)
MONOCYTES # BLD: 0.7 K/UL (ref 0.1–1.3)
MONOCYTES NFR BLD: 12 % (ref 4–12)
NEUTS SEG # BLD: 2.9 K/UL (ref 1.7–8.2)
NEUTS SEG NFR BLD: 49 % (ref 43–78)
NRBC # BLD: 0 K/UL (ref 0–0.2)
PLATELET # BLD AUTO: 241 K/UL (ref 150–450)
PMV BLD AUTO: 10.2 FL (ref 9.4–12.3)
POTASSIUM SERPL-SCNC: 4.6 MMOL/L (ref 3.5–5.1)
PROT SERPL-MCNC: 7 G/DL (ref 6.3–8.2)
RBC # BLD AUTO: 4.75 M/UL (ref 4.05–5.2)
SODIUM SERPL-SCNC: 142 MMOL/L (ref 136–145)
WBC # BLD AUTO: 5.9 K/UL (ref 4.3–11.1)

## 2024-05-23 PROCEDURE — 1090F PRES/ABSN URINE INCON ASSESS: CPT

## 2024-05-23 PROCEDURE — 85025 COMPLETE CBC W/AUTO DIFF WBC: CPT

## 2024-05-23 PROCEDURE — 99214 OFFICE O/P EST MOD 30 MIN: CPT

## 2024-05-23 PROCEDURE — 1123F ACP DISCUSS/DSCN MKR DOCD: CPT

## 2024-05-23 PROCEDURE — 80053 COMPREHEN METABOLIC PANEL: CPT

## 2024-05-23 PROCEDURE — 36415 COLL VENOUS BLD VENIPUNCTURE: CPT

## 2024-05-23 PROCEDURE — G8427 DOCREV CUR MEDS BY ELIG CLIN: HCPCS

## 2024-05-23 PROCEDURE — 1036F TOBACCO NON-USER: CPT

## 2024-05-23 PROCEDURE — G8417 CALC BMI ABV UP PARAM F/U: HCPCS

## 2024-05-23 NOTE — PATIENT INSTRUCTIONS
-Stop taking Calcium  -I would like calcium level rechecked in a week or two  -I am ordering Dexa scan which will be due in October  -You can stop letrozole in August as the BCI test shows no benefit.

## 2024-05-25 NOTE — PROGRESS NOTES
[facility]: Established patient - follow up     Chief Complaint   Patient presents with    Follow-up     Diagnosis: breast cancer    History of Present Illness:  Ms. Gonzalez is a 89 y.o. female who presents today for management of Ms. Gonzalez is a 83 y.o. female who presents today in referral from Dr. Jason Ignacio for consultation regarding breast cancer.  The past medical history is significant for HTN, constipation, depression, knee replacement,  s/p hysterectomy, bladder repair.  She underwent mammogram on 12/12/18 that showed superficial right breast mass at 5:00 6cm from the nipple. 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  She met with Dr Gomez and after extensive discussion she decided that she'd like to undergo lumpectomy.  She has hx of previous breast surgery with lumpectomy and XRT in 2007.  She thinks she had a SLNx in 2007.  She is here  to discuss further tx options.  MRI showed additional site of concern and she underwent bx of the breast c/w intraductal hyperplasia.  She then underwent a partial mastectomy with close DCIS margins.  Resection revealed margins <1mm with DCIS.         Social Hx: , non-smoker, drinks about 7 glasses wine/wk, no known exposures   Family Hx: maternal aunt breast cancer at 35, mat aunt 55 breast cancer, son battled metastatic GIST from 35-53.       She is here today for FU on Letrozole. She denies any side effects from the drug. Her BCI showed no added benefit to continuing Letrozole after 5 years. Five years will have been completed in August. Her next dexa will be in October. Mammogram in February with [facility]. She has no current breast concerns. She sees Dr. Diehl with GYN for elevated  which has been stable and he recommends she can follow with PCP concerning this. Today's VS and labs were reviewed and are stable. All questions were answered

## 2024-05-28 ENCOUNTER — TELEPHONE (OUTPATIENT)
Dept: ONCOLOGY | Age: 89
End: 2024-05-28

## 2024-05-28 DIAGNOSIS — Z17.0 MALIGNANT NEOPLASM OF BREAST IN FEMALE, ESTROGEN RECEPTOR POSITIVE, UNSPECIFIED LATERALITY, UNSPECIFIED SITE OF BREAST (HCC): Primary | ICD-10-CM

## 2024-05-28 DIAGNOSIS — E83.52 HYPERCALCEMIA: ICD-10-CM

## 2024-05-28 DIAGNOSIS — C50.919 MALIGNANT NEOPLASM OF BREAST IN FEMALE, ESTROGEN RECEPTOR POSITIVE, UNSPECIFIED LATERALITY, UNSPECIFIED SITE OF BREAST (HCC): Primary | ICD-10-CM

## 2024-05-28 NOTE — TELEPHONE ENCOUNTER
Chart reviewed by NP.  Unsuccessful return call to patient.  LVM informing patient to hold off on Calcium and multivitamin until we repeat labs. Also inquired if lab appointment has been made @ PCP office.  Awaiting response.

## 2024-05-28 NOTE — TELEPHONE ENCOUNTER
Physician provider: Dr. Morgan  Reason for today's call (Please detail here patients chief complaint): Questions about last appt  Last office visit:5/23/24  Preferred pharmacy (If refill request): N/A  Calls to office within the last 48 hours?:No    Patient notified that their information will be routed to the [facility] clinical triage team for review. Patient is advised that they will receive a phone call from the triage department. If symptom related and symptoms worsen before receiving a call back, the patient has been advised to proceed to the nearest ED.      Pt calling with questions about what she was told at last appt on 5/23/24.  Taken off of calcium, no calcium tablets, but is multivitamin ok?  Requested we send order for blood work to be done at office of Dr. Bliss in 2 weeks  Bone Density scan - does she need to call and make the appt or do we?

## 2024-06-03 ENCOUNTER — TELEPHONE (OUTPATIENT)
Dept: RADIATION ONCOLOGY | Age: 89
End: 2024-06-03

## 2024-06-03 NOTE — TELEPHONE ENCOUNTER
Returned call to patient.  Patient requests to have her labs ordered @ [facility].  Will forward to Dr. Morgan's team.

## 2024-06-03 NOTE — TELEPHONE ENCOUNTER
Chart reviewed by team.  Unsuccessful return call to patient.  LVM informing lab orders will be faxed to [facility].

## 2024-06-07 ENCOUNTER — TELEPHONE (OUTPATIENT)
Dept: ONCOLOGY | Age: 89
End: 2024-06-07

## 2024-06-07 NOTE — TELEPHONE ENCOUNTER
Physician provider: Dr. Morgan  Reason for today's call (Please detail here patients chief complaint): Discuss Labs  Last office visit:05/23/2024  Preferred pharmacy (If refill request): n/a  Calls to office within the last 48 hours?:No    Patient notified that their information will be routed to the [facility] clinical triage team for review. Patient is advised that they will receive a phone call from the triage department. If symptom related and symptoms worsen before receiving a call back, the patient has been advised to proceed to the nearest ED.     Pt called stating that she would like someone to call her back to discuss results done on 06/4. She states that she has the results in her hand and would like to know what they mean.

## 2024-06-07 NOTE — TELEPHONE ENCOUNTER
Returned call to patient.  Inquired about calcium level.  Informed calcium was WNL.  Verbalized understanding.

## 2024-08-20 ENCOUNTER — TELEPHONE (OUTPATIENT)
Dept: ONCOLOGY | Age: 89
End: 2024-08-20

## 2024-08-20 NOTE — TELEPHONE ENCOUNTER
Unsuccessful return call to patient.  Instructed patient to finish the month of August and then discontinue.  Instructed to call back for any other concerns.

## 2024-08-20 NOTE — TELEPHONE ENCOUNTER
Physician provider: Dr. Morgan  Reason for today's call (Please detail here patients chief complaint): Question about medication  Last office visit:N/A  Preferred pharmacy (If refill request): N/A  Calls to office within the last 48 hours?:No    Patient notified that their information will be routed to the [facility] clinical triage team for review. Patient is advised that they will receive a phone call from the triage department. If symptom related and symptoms worsen before receiving a call back, the patient has been advised to proceed to the nearest ED.      Pt called about her letrozole medication.  She was told to stop taking the medication during August and she wants to know exactly when she needs to stop taking it.

## 2024-11-12 DIAGNOSIS — C50.919 MALIGNANT NEOPLASM OF BREAST IN FEMALE, ESTROGEN RECEPTOR POSITIVE, UNSPECIFIED LATERALITY, UNSPECIFIED SITE OF BREAST (HCC): Primary | ICD-10-CM

## 2024-11-12 DIAGNOSIS — Z17.0 MALIGNANT NEOPLASM OF BREAST IN FEMALE, ESTROGEN RECEPTOR POSITIVE, UNSPECIFIED LATERALITY, UNSPECIFIED SITE OF BREAST (HCC): Primary | ICD-10-CM

## 2024-11-12 DIAGNOSIS — E55.9 VITAMIN D DEFICIENCY: ICD-10-CM

## 2024-11-12 NOTE — PROGRESS NOTES
Absolute 0.4 0.0 - 0.8 K/UL    Basophils Absolute 0.1 0.0 - 0.2 K/UL    Immature Granulocytes Absolute 0.1 0.0 - 0.5 K/UL    Differential Type AUTOMATED       Imaging: reviewed   Pathology:                                                                                                           ASSESSMENT:   Diagnosis Orders   1. Malignant neoplasm of breast in female, estrogen receptor positive, unspecified laterality, unspecified site of breast (HCC)  CBC with Auto Differential    Comprehensive Metabolic Panel    [facility] DIGITAL SCREEN UNI LEFT    MRI BRAIN W WO CONTRAST      2. Vitamin D deficiency  Vitamin D 25 Hydroxy      3. Breast cancer screening by mammogram  FAITH [facility]SHALL DIGITAL SCREEN UNI LEFT      4. Memory changes  MRI BRAIN W WO CONTRAST      Mrs Gonzalez is here to follow up.          1. Right breast IDC with lobular features; s/p core bx, low grade, LVI and in situ not identified. %, [place] 65% and Her2 negative (+1)   S/p bx of a benign lesion seen on MRI   S/p lumpectomy with DCIS <2mm margins; s/p re-excision of margins with DCIS at <1mm   Subsequently underwent mastectomy in 5/2019 - with additional residual invasive ductal carcinoma - pT1a - no LN were sampled per pt's wishes/age   Hx of right breast cancer (close to current location) in 2007 s/p lumpectomy and XRT   - was on anastrozole - tolerating letrozole better - completed 5 years     - here for follow-up.  She was seen with nurse and Mr MURRY,  who is observing as a new hire.  She is doing poorly from an emotional standpoint.  She is sad and more depressed.  Her  is going through treatment for bladder cancer, her grandson has autism and she struggles with that.  She feels she has a lot on her plate.  She did see a psychiatrist but knew the provider from [facility] [employer] and decided not to continue to see them.  She will continue with a new psychologist.  She has been seen by urology because of interstitial cystitis and is currently

## 2024-11-22 ENCOUNTER — HOSPITAL ENCOUNTER (OUTPATIENT)
Dept: LAB | Age: 89
Discharge: HOME | End: 2024-11-22
Payer: MEDICARE

## 2024-11-22 ENCOUNTER — OFFICE VISIT (OUTPATIENT)
Dept: ONCOLOGY | Age: 89
End: 2024-11-22
Payer: MEDICARE

## 2024-11-22 VITALS
SYSTOLIC BLOOD PRESSURE: 145 MMHG | HEART RATE: 78 BPM | HEIGHT: 64 IN | DIASTOLIC BLOOD PRESSURE: 92 MMHG | WEIGHT: 152 LBS | OXYGEN SATURATION: 90 % | RESPIRATION RATE: 16 BRPM | TEMPERATURE: 97.6 F | BODY MASS INDEX: 25.95 KG/M2

## 2024-11-22 DIAGNOSIS — Z12.31 BREAST CANCER SCREENING BY MAMMOGRAM: ICD-10-CM

## 2024-11-22 DIAGNOSIS — C50.919 MALIGNANT NEOPLASM OF BREAST IN FEMALE, ESTROGEN RECEPTOR POSITIVE, UNSPECIFIED LATERALITY, UNSPECIFIED SITE OF BREAST (HCC): Primary | ICD-10-CM

## 2024-11-22 DIAGNOSIS — Z17.0 MALIGNANT NEOPLASM OF BREAST IN FEMALE, ESTROGEN RECEPTOR POSITIVE, UNSPECIFIED LATERALITY, UNSPECIFIED SITE OF BREAST (HCC): Primary | ICD-10-CM

## 2024-11-22 DIAGNOSIS — C50.919 MALIGNANT NEOPLASM OF BREAST IN FEMALE, ESTROGEN RECEPTOR POSITIVE, UNSPECIFIED LATERALITY, UNSPECIFIED SITE OF BREAST (HCC): ICD-10-CM

## 2024-11-22 DIAGNOSIS — R41.3 MEMORY CHANGES: ICD-10-CM

## 2024-11-22 DIAGNOSIS — E55.9 VITAMIN D DEFICIENCY: ICD-10-CM

## 2024-11-22 DIAGNOSIS — Z17.0 MALIGNANT NEOPLASM OF BREAST IN FEMALE, ESTROGEN RECEPTOR POSITIVE, UNSPECIFIED LATERALITY, UNSPECIFIED SITE OF BREAST (HCC): ICD-10-CM

## 2024-11-22 LAB
25(OH)D3 SERPL-MCNC: 31.3 NG/ML (ref 30–100)
ALBUMIN SERPL-MCNC: 3.9 G/DL (ref 3.2–4.6)
ALBUMIN/GLOB SERPL: 1.3 (ref 1–1.9)
ALP SERPL-CCNC: 91 U/L (ref 35–104)
ALT SERPL-CCNC: 11 U/L (ref 8–45)
ANION GAP SERPL CALC-SCNC: 10 MMOL/L (ref 7–16)
AST SERPL-CCNC: 21 U/L (ref 15–37)
BASOPHILS # BLD: 0.1 K/UL (ref 0–0.2)
BASOPHILS NFR BLD: 1 % (ref 0–2)
BILIRUB SERPL-MCNC: 0.8 MG/DL (ref 0–1.2)
BUN SERPL-MCNC: 28 MG/DL (ref 8–23)
CALCIUM SERPL-MCNC: 10.1 MG/DL (ref 8.8–10.2)
CHLORIDE SERPL-SCNC: 104 MMOL/L (ref 98–107)
CO2 SERPL-SCNC: 26 MMOL/L (ref 20–29)
CREAT SERPL-MCNC: 0.86 MG/DL (ref 0.6–1.1)
DIFFERENTIAL METHOD BLD: ABNORMAL
EOSINOPHIL # BLD: 0.4 K/UL (ref 0–0.8)
EOSINOPHIL NFR BLD: 7 % (ref 0.5–7.8)
ERYTHROCYTE [DISTWIDTH] IN BLOOD BY AUTOMATED COUNT: 13.1 % (ref 11.9–14.6)
GLOBULIN SER CALC-MCNC: 3.1 G/DL (ref 2.3–3.5)
GLUCOSE SERPL-MCNC: 96 MG/DL (ref 70–99)
HCT VFR BLD AUTO: 42.9 % (ref 35.8–46.3)
HGB BLD-MCNC: 14 G/DL (ref 11.7–15.4)
IMM GRANULOCYTES # BLD AUTO: 0.1 K/UL (ref 0–0.5)
IMM GRANULOCYTES NFR BLD AUTO: 2 % (ref 0–5)
LYMPHOCYTES # BLD: 1.6 K/UL (ref 0.5–4.6)
LYMPHOCYTES NFR BLD: 26 % (ref 13–44)
MCH RBC QN AUTO: 31 PG (ref 26.1–32.9)
MCHC RBC AUTO-ENTMCNC: 32.6 G/DL (ref 31.4–35)
MCV RBC AUTO: 95.1 FL (ref 82–102)
MONOCYTES # BLD: 0.9 K/UL (ref 0.1–1.3)
MONOCYTES NFR BLD: 14 % (ref 4–12)
NEUTS SEG # BLD: 3.2 K/UL (ref 1.7–8.2)
NEUTS SEG NFR BLD: 50 % (ref 43–78)
NRBC # BLD: 0 K/UL (ref 0–0.2)
PLATELET # BLD AUTO: 205 K/UL (ref 150–450)
PMV BLD AUTO: 9.8 FL (ref 9.4–12.3)
POTASSIUM SERPL-SCNC: 4.5 MMOL/L (ref 3.5–5.1)
PROT SERPL-MCNC: 7 G/DL (ref 6.3–8.2)
RBC # BLD AUTO: 4.51 M/UL (ref 4.05–5.2)
SODIUM SERPL-SCNC: 140 MMOL/L (ref 136–145)
WBC # BLD AUTO: 6.2 K/UL (ref 4.3–11.1)

## 2024-11-22 PROCEDURE — G8417 CALC BMI ABV UP PARAM F/U: HCPCS | Performed by: INTERNAL MEDICINE

## 2024-11-22 PROCEDURE — 1090F PRES/ABSN URINE INCON ASSESS: CPT | Performed by: INTERNAL MEDICINE

## 2024-11-22 PROCEDURE — 85025 COMPLETE CBC W/AUTO DIFF WBC: CPT

## 2024-11-22 PROCEDURE — 82306 VITAMIN D 25 HYDROXY: CPT

## 2024-11-22 PROCEDURE — 1126F AMNT PAIN NOTED NONE PRSNT: CPT | Performed by: INTERNAL MEDICINE

## 2024-11-22 PROCEDURE — 80053 COMPREHEN METABOLIC PANEL: CPT

## 2024-11-22 PROCEDURE — 99215 OFFICE O/P EST HI 40 MIN: CPT | Performed by: INTERNAL MEDICINE

## 2024-11-22 PROCEDURE — G8427 DOCREV CUR MEDS BY ELIG CLIN: HCPCS | Performed by: INTERNAL MEDICINE

## 2024-11-22 PROCEDURE — 1036F TOBACCO NON-USER: CPT | Performed by: INTERNAL MEDICINE

## 2024-11-22 PROCEDURE — G8484 FLU IMMUNIZE NO ADMIN: HCPCS | Performed by: INTERNAL MEDICINE

## 2024-11-22 PROCEDURE — 36415 COLL VENOUS BLD VENIPUNCTURE: CPT

## 2024-11-22 PROCEDURE — 1123F ACP DISCUSS/DSCN MKR DOCD: CPT | Performed by: INTERNAL MEDICINE

## 2024-11-22 RX ORDER — VIBEGRON 75 MG/1
75 TABLET, FILM COATED ORAL DAILY
COMMUNITY

## 2024-11-22 ASSESSMENT — PATIENT HEALTH QUESTIONNAIRE - PHQ9
1. LITTLE INTEREST OR PLEASURE IN DOING THINGS: NOT AT ALL
SUM OF ALL RESPONSES TO PHQ QUESTIONS 1-9: 0
SUM OF ALL RESPONSES TO PHQ QUESTIONS 1-9: 0
2. FEELING DOWN, DEPRESSED OR HOPELESS: NOT AT ALL
SUM OF ALL RESPONSES TO PHQ QUESTIONS 1-9: 0
SUM OF ALL RESPONSES TO PHQ9 QUESTIONS 1 & 2: 0
SUM OF ALL RESPONSES TO PHQ QUESTIONS 1-9: 0

## 2024-11-22 NOTE — PATIENT INSTRUCTIONS
Patient Information from Today's Visit    The members of your [facility]cology Medical [facility] are listed below:    Physician Provider: Therese Morgan, Medical Oncologist  Advanced Practice Clinician: Noemi Harmon NP  Registered Nurse: Urmila BARBOSA RN  Navigator: Gissel GONZALEZ RN or Becki ROMERO RN  Medical Assistant: Krissy VIVAR MA  : Fariba INFANTE   Supportive Care Services: Chiquis LAGOS LMSW    Diagnosis: Breast      Follow Up Instructions: 5-6 months or sooner if needed.    Labs reviewed.  Symptoms reviewed.  Discussed bone density scan results and recommendations.  Recommend at least 2000 international units (IU) of Vitamin D daily.  Recommend continuing with annual screening mammograms.  You can call to schedule this at [phone].  Discussed option of brain MRI - we will give you a paper order requisition and you can take this where you would like if you decide to proceed.    Treatment Summary has been discussed and given to patient:N/A      Current Labs:   Hospital Outpatient Visit on 11/22/2024   Component Date Value Ref Range Status    WBC 11/22/2024 6.2  4.3 - 11.1 K/uL Final    RBC 11/22/2024 4.51  4.05 - 5.2 M/uL Final    Hemoglobin 11/22/2024 14.0  11.7 - 15.4 g/dL Final    Hematocrit 11/22/2024 42.9  35.8 - 46.3 % Final    MCV 11/22/2024 95.1  82.0 - 102.0 FL Final    MCH 11/22/2024 31.0  26.1 - 32.9 PG Final    MCHC 11/22/2024 32.6  31.4 - 35.0 g/dL Final    RDW 11/22/2024 13.1  11.9 - 14.6 % Final    Platelets 11/22/2024 205  150 - 450 K/uL Final    MPV 11/22/2024 9.8  9.4 - 12.3 FL Final    nRBC 11/22/2024 0.00  0.0 - 0.2 K/uL Final    **Note: Absolute NRBC parameter is now reported with Hemogram**    Neutrophils % 11/22/2024 50  43 - 78 % Final    Lymphocytes % 11/22/2024 26  13 - 44 % Final    Monocytes % 11/22/2024 14 (H)  4.0 - 12.0 % Final    Eosinophils % 11/22/2024 7  0.5 - 7.8 % Final    Basophils % 11/22/2024 1  0.0 - 2.0 % Final    Immature Granulocytes % 11/22/2024 2  0.0 - 5.0 % Final

## 2024-11-26 ENCOUNTER — TELEPHONE (OUTPATIENT)
Dept: ONCOLOGY | Age: 89
End: 2024-11-26

## 2024-11-26 NOTE — TELEPHONE ENCOUNTER
Call to pt to discuss Vitamin D recs - no answer.  LVM for pt to call back if she has questions re recs.

## 2024-11-28 PROBLEM — E55.9 VITAMIN D DEFICIENCY: Status: ACTIVE | Noted: 2024-11-28

## 2024-11-28 PROBLEM — R41.3 MEMORY CHANGES: Status: ACTIVE | Noted: 2024-11-28

## 2024-11-28 PROBLEM — Z12.31 BREAST CANCER SCREENING BY MAMMOGRAM: Status: ACTIVE | Noted: 2024-11-28

## 2025-01-06 ENCOUNTER — TELEPHONE (OUTPATIENT)
Dept: ONCOLOGY | Age: 89
End: 2025-01-06

## 2025-01-06 NOTE — TELEPHONE ENCOUNTER
Patient requests to have brain scan and mammogram to be completed @ [facility] in [place].  Informed will forward request to Dr. Morgan's team.

## 2025-01-06 NOTE — TELEPHONE ENCOUNTER
Chart reviewed by team.  Unsuccessful return call to patient.  LVM informing, per nurse, will mail her the paper requisitions of the scan orders and she can bring those to wherever she would like and complete at her convenience.  Instructed to call back for any other concerns.

## 2025-01-06 NOTE — TELEPHONE ENCOUNTER
Physician provider: Dr. Morgan  Reason for today's call (Please detail here patients chief complaint): questions about orders    Last office visit:NA  Patient Callback Number: [phone]  Was callback number verified?: Yes  Preferred pharmacy (If refill request): na  Calls to office within the last 48 hours?:No    Patient notified that their information will be routed to the [facility] clinical triage team for review. Patient is advised that they will receive a phone call from the triage department. If symptom related and symptoms worsen before receiving a call back, the patient has been advised to proceed to the nearest ED.          Patient have questions about orders.   [phone]

## 2025-01-17 ENCOUNTER — HOSPITAL ENCOUNTER (OUTPATIENT)
Dept: MRI IMAGING | Age: 89
Discharge: HOME | End: 2025-01-20
Attending: INTERNAL MEDICINE
Payer: MEDICARE

## 2025-01-17 DIAGNOSIS — Z17.0 MALIGNANT NEOPLASM OF BREAST IN FEMALE, ESTROGEN RECEPTOR POSITIVE, UNSPECIFIED LATERALITY, UNSPECIFIED SITE OF BREAST (HCC): ICD-10-CM

## 2025-01-17 DIAGNOSIS — C50.919 MALIGNANT NEOPLASM OF BREAST IN FEMALE, ESTROGEN RECEPTOR POSITIVE, UNSPECIFIED LATERALITY, UNSPECIFIED SITE OF BREAST (HCC): ICD-10-CM

## 2025-01-17 DIAGNOSIS — R41.3 MEMORY CHANGES: ICD-10-CM

## 2025-01-17 PROCEDURE — 6360000004 HC RX CONTRAST MEDICATION: Performed by: INTERNAL MEDICINE

## 2025-01-17 PROCEDURE — A9579 GAD-BASE MR CONTRAST NOS,1ML: HCPCS | Performed by: INTERNAL MEDICINE

## 2025-01-17 PROCEDURE — 70553 MRI BRAIN STEM W/O & W/DYE: CPT

## 2025-01-17 RX ADMIN — GADOTERIDOL 13 ML: 279.3 INJECTION, SOLUTION INTRAVENOUS at 15:06

## 2025-01-20 ENCOUNTER — TELEPHONE (OUTPATIENT)
Dept: ONCOLOGY | Age: 89
End: 2025-01-20

## 2025-01-20 NOTE — TELEPHONE ENCOUNTER
Physician provider: Dr. Morgan  Reason for today's call (Please detail here patients chief complaint): Pt requesting interpretation of MRI done on 1/17  Last office visit:n/a  Patient Callback Number: [phone]  Was callback number verified?: Yes  Preferred pharmacy (If refill request): n/a  Calls to office within the last 48 hours?:No    Patient notified that their information will be routed to the [facility] clinical triage team for review. Patient is advised that they will receive a phone call from the triage department. If symptom related and symptoms worsen before receiving a call back, the patient has been advised to proceed to the nearest ED.         Pt had an MRI done Friday (1/17), and is requesting interpretation of the results. Would like to speak with Dr. Morgan if possible.    [phone]

## 2025-01-21 ENCOUNTER — TELEPHONE (OUTPATIENT)
Dept: ONCOLOGY | Age: 89
End: 2025-01-21

## 2025-01-21 NOTE — TELEPHONE ENCOUNTER
Physician provider: Dr. Morgan  Reason for today's call (Please detail here patients chief complaint): pt returning Dr. Morgan's call  Last office visit:n/a  Patient Callback Number: [phone]  Was callback number verified?: Yes  Preferred pharmacy (If refill request): n/a  Calls to office within the last 48 hours?:No    Patient notified that their information will be routed to the [facility] clinical triage team for review. Patient is advised that they will receive a phone call from the triage department. If symptom related and symptoms worsen before receiving a call back, the patient has been advised to proceed to the nearest ED.         Pt returning Dr. Morgan's phone call. Pt says she did not have her phone nearby when Dr. Morgan called earlier, but will have it with her the rest of the day.    [phone]

## 2025-01-23 ENCOUNTER — TELEPHONE (OUTPATIENT)
Dept: ONCOLOGY | Age: 89
End: 2025-01-23

## 2025-01-23 ENCOUNTER — CLINICAL DOCUMENTATION (OUTPATIENT)
Dept: ONCOLOGY | Age: 89
End: 2025-01-23

## 2025-01-23 NOTE — TELEPHONE ENCOUNTER
Physician provider: Dr. Morgan  Reason for today's call (Please detail here patients chief complaint): MRI results  Last office visit:n/a  Patient Callback Number: [phone]  Was callback number verified?: Yes  Preferred pharmacy (If refill request): n/a  Calls to office within the last 48 hours?:No    Patient notified that their information will be routed to the [facility] clinical triage team for review. Patient is advised that they will receive a phone call from the triage department. If symptom related and symptoms worsen before receiving a call back, the patient has been advised to proceed to the nearest ED.          Pt would like a call back to discuss MRI of the brain results.     [phone]

## 2025-01-23 NOTE — PROGRESS NOTES
Attempted to call pt this afternoon again - left VM for her to call back as did not answer.  MR w no metastasis per radiology.  Would rec neurology evaluation for chronic ischemic changes noted.  RN can review with pt for ease of communication when pt calls back.  Team aware.

## 2025-01-24 ENCOUNTER — TELEPHONE (OUTPATIENT)
Dept: ONCOLOGY | Age: 89
End: 2025-01-24

## 2025-01-24 DIAGNOSIS — R90.89 ABNORMAL FINDING ON MRI OF BRAIN: Primary | ICD-10-CM

## 2025-01-24 NOTE — TELEPHONE ENCOUNTER
Unsuccessful return call to patient.  LVM informing patient of multiple unsuccessful attempts to contact by phone.  Instructed to confirm ringer is on and phone not on silence.  Informed will forward message to Dr. Morgan.

## 2025-01-24 NOTE — TELEPHONE ENCOUNTER
Physician provider: Dr. Morgan  Reason for today's call (Please detail here patients chief complaint): brain scan  Last office visit:NA  Patient Callback Number: [phone]  Was callback number verified?: Yes  Preferred pharmacy (If refill request): NA  Calls to office within the last 48 hours?:No    Patient notified that their information will be routed to the [facility] clinical triage team for review. Patient is advised that they will receive a phone call from the triage department. If symptom related and symptoms worsen before receiving a call back, the patient has been advised to proceed to the nearest ED.         Patient would like to talk to someone about her brain scan.    [phone]

## 2025-01-24 NOTE — TELEPHONE ENCOUNTER
Call to pt to review MRI brain after multiple unsuccessful return call attempts.  Reviewed results per MD review and let pt know that neurology referral is recommended for chronic changes seen on radiologist's report.  Patient was agreeable and VU.  Referral pended to MD for review and signature.  Patient VU and appreciated call.

## 2025-03-05 ENCOUNTER — HOSPITAL ENCOUNTER (OUTPATIENT)
Dept: MAMMOGRAPHY | Age: 89
Discharge: HOME | End: 2025-03-08
Attending: INTERNAL MEDICINE
Payer: MEDICARE

## 2025-03-05 VITALS — WEIGHT: 152 LBS | BODY MASS INDEX: 25.95 KG/M2 | HEIGHT: 64 IN

## 2025-03-05 DIAGNOSIS — Z12.31 BREAST CANCER SCREENING BY MAMMOGRAM: ICD-10-CM

## 2025-03-05 DIAGNOSIS — C50.919 MALIGNANT NEOPLASM OF BREAST IN FEMALE, ESTROGEN RECEPTOR POSITIVE, UNSPECIFIED LATERALITY, UNSPECIFIED SITE OF BREAST (HCC): ICD-10-CM

## 2025-03-05 DIAGNOSIS — Z17.0 MALIGNANT NEOPLASM OF BREAST IN FEMALE, ESTROGEN RECEPTOR POSITIVE, UNSPECIFIED LATERALITY, UNSPECIFIED SITE OF BREAST (HCC): ICD-10-CM

## 2025-03-05 PROCEDURE — 77063 BREAST TOMOSYNTHESIS BI: CPT

## 2025-06-10 NOTE — PROGRESS NOTES
20 - 29 mmol/L    Anion Gap 10 7 - 16 mmol/L    Glucose 85 70 - 99 mg/dL    BUN 18 8 - 23 MG/DL    Creatinine 0.82 0.60 - 1.10 MG/DL    Est, Glom Filt Rate 69 >60 ml/min/1.73m2    Calcium 10.3 (H) 8.8 - 10.2 MG/DL    Total Bilirubin 0.4 0.0 - 1.2 MG/DL    ALT 13 8 - 45 U/L    AST 21 15 - 37 U/L    Alk Phosphatase 104 35 - 104 U/L    Total Protein 7.3 6.3 - 8.2 g/dL    Albumin 4.0 3.2 - 4.6 g/dL    Globulin 3.3 2.3 - 3.5 g/dL    Albumin/Globulin Ratio 1.2 1.0 - 1.9     Vitamin D 25 Hydroxy    Collection Time: 06/13/25  9:36 AM   Result Value Ref Range    Vit D, 25-Hydroxy 42.4 30.0 - 100.0 ng/mL     Imaging: reviewed   Pathology:                                                                                                           ASSESSMENT:   Diagnosis Orders   1. Vitamin D deficiency  Vitamin D 25 Hydroxy      2. Malignant neoplasm of breast in female, estrogen receptor positive, unspecified laterality, unspecified site of breast (HCC)  CBC with Auto Differential    Comprehensive Metabolic Panel      3. Breast cancer screening by mammogram        Mrs Gonzalez is here to follow up.          1. Right breast IDC with lobular features; s/p core bx, low grade, LVI and in situ not identified. %, [place] 65% and Her2 negative (+1)   S/p bx of a benign lesion seen on MRI   S/p lumpectomy with DCIS <2mm margins; s/p re-excision of margins with DCIS at <1mm   Subsequently underwent mastectomy in 5/2019 - with additional residual invasive ductal carcinoma - pT1a - no LN were sampled per pt's wishes/age   Hx of right breast cancer (close to current location) in 2007 s/p lumpectomy and XRT   - was on anastrozole - tolerated letrozole better - completed 5 years     Assessment & Plan  No signs of recurrent disease at this time; chest wall examination normal   - labs reviewed - Calcium levels are slightly elevated at 10.3, which is not a significant concern. Cbc and rest cmp reassuring.   - brain MRI revealed no tumors but

## 2025-06-13 ENCOUNTER — OFFICE VISIT (OUTPATIENT)
Dept: ONCOLOGY | Age: 89
End: 2025-06-13
Payer: MEDICARE

## 2025-06-13 ENCOUNTER — HOSPITAL ENCOUNTER (OUTPATIENT)
Dept: LAB | Age: 89
Discharge: HOME | End: 2025-06-13
Payer: MEDICARE

## 2025-06-13 VITALS
HEART RATE: 60 BPM | HEIGHT: 64 IN | TEMPERATURE: 97.7 F | BODY MASS INDEX: 25.27 KG/M2 | SYSTOLIC BLOOD PRESSURE: 137 MMHG | OXYGEN SATURATION: 99 % | DIASTOLIC BLOOD PRESSURE: 90 MMHG | RESPIRATION RATE: 16 BRPM | WEIGHT: 148 LBS

## 2025-06-13 DIAGNOSIS — Z12.31 BREAST CANCER SCREENING BY MAMMOGRAM: ICD-10-CM

## 2025-06-13 DIAGNOSIS — E55.9 VITAMIN D DEFICIENCY: ICD-10-CM

## 2025-06-13 DIAGNOSIS — Z17.0 MALIGNANT NEOPLASM OF BREAST IN FEMALE, ESTROGEN RECEPTOR POSITIVE, UNSPECIFIED LATERALITY, UNSPECIFIED SITE OF BREAST (HCC): ICD-10-CM

## 2025-06-13 DIAGNOSIS — C50.919 MALIGNANT NEOPLASM OF BREAST IN FEMALE, ESTROGEN RECEPTOR POSITIVE, UNSPECIFIED LATERALITY, UNSPECIFIED SITE OF BREAST (HCC): ICD-10-CM

## 2025-06-13 LAB
25(OH)D3 SERPL-MCNC: 42.4 NG/ML (ref 30–100)
ALBUMIN SERPL-MCNC: 4 G/DL (ref 3.2–4.6)
ALBUMIN/GLOB SERPL: 1.2 (ref 1–1.9)
ALP SERPL-CCNC: 104 U/L (ref 35–104)
ALT SERPL-CCNC: 13 U/L (ref 8–45)
ANION GAP SERPL CALC-SCNC: 10 MMOL/L (ref 7–16)
AST SERPL-CCNC: 21 U/L (ref 15–37)
BASOPHILS # BLD: 0.05 K/UL (ref 0–0.2)
BASOPHILS NFR BLD: 1 % (ref 0–2)
BILIRUB SERPL-MCNC: 0.4 MG/DL (ref 0–1.2)
BUN SERPL-MCNC: 18 MG/DL (ref 8–23)
CALCIUM SERPL-MCNC: 10.3 MG/DL (ref 8.8–10.2)
CHLORIDE SERPL-SCNC: 101 MMOL/L (ref 98–107)
CO2 SERPL-SCNC: 28 MMOL/L (ref 20–29)
CREAT SERPL-MCNC: 0.82 MG/DL (ref 0.6–1.1)
DIFFERENTIAL METHOD BLD: ABNORMAL
EOSINOPHIL # BLD: 0.27 K/UL (ref 0–0.8)
EOSINOPHIL NFR BLD: 5.3 % (ref 0.5–7.8)
ERYTHROCYTE [DISTWIDTH] IN BLOOD BY AUTOMATED COUNT: 13.5 % (ref 11.9–14.6)
GLOBULIN SER CALC-MCNC: 3.3 G/DL (ref 2.3–3.5)
GLUCOSE SERPL-MCNC: 85 MG/DL (ref 70–99)
HCT VFR BLD AUTO: 44.3 % (ref 35.8–46.3)
HGB BLD-MCNC: 14.4 G/DL (ref 11.7–15.4)
IMM GRANULOCYTES # BLD AUTO: 0.03 K/UL (ref 0–0.5)
IMM GRANULOCYTES NFR BLD AUTO: 0.6 % (ref 0–5)
LYMPHOCYTES # BLD: 1.86 K/UL (ref 0.5–4.6)
LYMPHOCYTES NFR BLD: 36.7 % (ref 13–44)
MCH RBC QN AUTO: 30.3 PG (ref 26.1–32.9)
MCHC RBC AUTO-ENTMCNC: 32.5 G/DL (ref 31.4–35)
MCV RBC AUTO: 93.1 FL (ref 82–102)
MONOCYTES # BLD: 0.64 K/UL (ref 0.1–1.3)
MONOCYTES NFR BLD: 12.6 % (ref 4–12)
NEUTS SEG # BLD: 2.22 K/UL (ref 1.7–8.2)
NEUTS SEG NFR BLD: 43.8 % (ref 43–78)
NRBC # BLD: 0 K/UL (ref 0–0.2)
PLATELET # BLD AUTO: 219 K/UL (ref 150–450)
PMV BLD AUTO: 9.4 FL (ref 9.4–12.3)
POTASSIUM SERPL-SCNC: 4.2 MMOL/L (ref 3.5–5.1)
PROT SERPL-MCNC: 7.3 G/DL (ref 6.3–8.2)
RBC # BLD AUTO: 4.76 M/UL (ref 4.05–5.2)
SODIUM SERPL-SCNC: 139 MMOL/L (ref 136–145)
WBC # BLD AUTO: 5.1 K/UL (ref 4.3–11.1)

## 2025-06-13 PROCEDURE — 82306 VITAMIN D 25 HYDROXY: CPT

## 2025-06-13 PROCEDURE — G8417 CALC BMI ABV UP PARAM F/U: HCPCS | Performed by: INTERNAL MEDICINE

## 2025-06-13 PROCEDURE — 1090F PRES/ABSN URINE INCON ASSESS: CPT | Performed by: INTERNAL MEDICINE

## 2025-06-13 PROCEDURE — 1125F AMNT PAIN NOTED PAIN PRSNT: CPT | Performed by: INTERNAL MEDICINE

## 2025-06-13 PROCEDURE — 1036F TOBACCO NON-USER: CPT | Performed by: INTERNAL MEDICINE

## 2025-06-13 PROCEDURE — 1123F ACP DISCUSS/DSCN MKR DOCD: CPT | Performed by: INTERNAL MEDICINE

## 2025-06-13 PROCEDURE — G8427 DOCREV CUR MEDS BY ELIG CLIN: HCPCS | Performed by: INTERNAL MEDICINE

## 2025-06-13 PROCEDURE — 99214 OFFICE O/P EST MOD 30 MIN: CPT | Performed by: INTERNAL MEDICINE

## 2025-06-13 PROCEDURE — 36415 COLL VENOUS BLD VENIPUNCTURE: CPT

## 2025-06-13 PROCEDURE — 80053 COMPREHEN METABOLIC PANEL: CPT

## 2025-06-13 PROCEDURE — 85025 COMPLETE CBC W/AUTO DIFF WBC: CPT

## 2025-06-13 RX ORDER — METHENAMINE HIPPURATE 1000 MG/1
1 TABLET ORAL 2 TIMES DAILY
COMMUNITY
Start: 2025-01-22

## 2025-06-13 RX ORDER — QUETIAPINE FUMARATE 25 MG/1
25 TABLET, FILM COATED ORAL
COMMUNITY
Start: 2025-05-08

## 2025-06-13 RX ORDER — TROSPIUM CHLORIDE 20 MG/1
20 TABLET, FILM COATED ORAL DAILY
COMMUNITY
Start: 2025-04-22

## 2025-06-13 ASSESSMENT — PATIENT HEALTH QUESTIONNAIRE - PHQ9
SUM OF ALL RESPONSES TO PHQ QUESTIONS 1-9: 0
1. LITTLE INTEREST OR PLEASURE IN DOING THINGS: NOT AT ALL
SUM OF ALL RESPONSES TO PHQ QUESTIONS 1-9: 0
2. FEELING DOWN, DEPRESSED OR HOPELESS: NOT AT ALL

## 2025-06-13 NOTE — PATIENT INSTRUCTIONS
Patient Information from Today's Visit    The members of your Oncology Medical [facility] are listed below:    Physician Provider: Therese Morgan, Medical Oncologist  Registered Nurse: Urmila BARBOSA RN  Navigator: Gissel GONZALEZ RN or Becki ROMERO RN  Medical Assistant: Krissy VIVAR MA  : Fariba INFANTE   Supportive Care Services: Chiquis LAGOS LMSW    Diagnosis: Breast      Follow Up Instructions:     Labs reviewed.  Symptoms reviewed.  You can do labs locally about 2 weeks prior to your appointment with us.  Mammogram due in March. You have requested that your primary care provider order this for you.      Treatment Summary has been discussed and given to patient:N/A      Current Labs:   Hospital Outpatient Visit on 06/13/2025   Component Date Value Ref Range Status    WBC 06/13/2025 5.1  4.3 - 11.1 K/uL Final    RBC 06/13/2025 4.76  4.05 - 5.2 M/uL Final    Hemoglobin 06/13/2025 14.4  11.7 - 15.4 g/dL Final    Hematocrit 06/13/2025 44.3  35.8 - 46.3 % Final    MCV 06/13/2025 93.1  82.0 - 102.0 FL Final    MCH 06/13/2025 30.3  26.1 - 32.9 PG Final    MCHC 06/13/2025 32.5  31.4 - 35.0 g/dL Final    RDW 06/13/2025 13.5  11.9 - 14.6 % Final    Platelets 06/13/2025 219  150 - 450 K/uL Final    MPV 06/13/2025 9.4  9.4 - 12.3 FL Final    nRBC 06/13/2025 0.00  0.0 - 0.2 K/uL Final    **Note: Absolute NRBC parameter is now reported with Hemogram**    Differential Type 06/13/2025 AUTOMATED    Final    Neutrophils % 06/13/2025 43.8  43.0 - 78.0 % Final    Lymphocytes % 06/13/2025 36.7  13.0 - 44.0 % Final    Monocytes % 06/13/2025 12.6 (H)  4.0 - 12.0 % Final    Eosinophils % 06/13/2025 5.3  0.5 - 7.8 % Final    Basophils % 06/13/2025 1.0  0.0 - 2.0 % Final    Immature Granulocytes % 06/13/2025 0.6  0.0 - 5.0 % Final    Neutrophils Absolute 06/13/2025 2.22  1.70 - 8.20 K/UL Final    Lymphocytes Absolute 06/13/2025 1.86  0.50 - 4.60 K/UL Final    Monocytes Absolute 06/13/2025 0.64  0.10 - 1.30 K/UL Final    Eosinophils Absolute

## 2025-06-19 ENCOUNTER — RESULTS FOLLOW-UP (OUTPATIENT)
Dept: ONCOLOGY | Age: 89
End: 2025-06-19

## 2025-06-23 ENCOUNTER — TELEPHONE (OUTPATIENT)
Dept: ONCOLOGY | Age: 89
End: 2025-06-23

## 2025-06-23 NOTE — TELEPHONE ENCOUNTER
Received incoming call from pt - per phone operators, pt is not interested in a referral/seeing neurology at this time.

## 2025-06-23 NOTE — TELEPHONE ENCOUNTER
Call to pt - no answer.  LVM for pt to give us a call back.    Calling pt to find out if she has found a local neurologist that she would like us to refer her to, as we had discussed at last OV, or if she would like us to re-refer to [facility] neurology.

## (undated) DEVICE — CARDINAL HEALTH FLEXIBLE LIGHT HANDLE COVER: Brand: CARDINAL HEALTH

## (undated) DEVICE — REM POLYHESIVE ADULT PATIENT RETURN ELECTRODE: Brand: VALLEYLAB

## (undated) DEVICE — SOLUTION IV 1000ML 0.9% SOD CHL

## (undated) DEVICE — SUTURE MCRYL SZ 4-0 L27IN ABSRB UD L19MM PS-2 1/2 CIR PRIM Y426H

## (undated) DEVICE — STRIP,CLOSURE,WOUND,MEDI-STRIP,1/2X4: Brand: MEDLINE

## (undated) DEVICE — INTENDED TO BE USED TO OCCLUDE, RETRACT AND IDENTIFY ARTERIES, VEINS, TENDONS AND NERVES IN SURGICAL PROCEDURES: Brand: STERION®  VESSEL LOOP

## (undated) DEVICE — TRAY PREP DRY W/ PREM GLV 2 APPL 6 SPNG 2 UNDPD 1 OVERWRAP

## (undated) DEVICE — MASTISOL ADHESIVE LIQ 2/3ML

## (undated) DEVICE — NEEDLE HYPO 21GA L1.5IN INTRAMUSCULAR S STL LATCH BVL UP

## (undated) DEVICE — SHEET, DRAPE, SPLIT, STERILE: Brand: MEDLINE

## (undated) DEVICE — 2000CC GUARDIAN II: Brand: GUARDIAN

## (undated) DEVICE — SUTURE ETHLN SZ 2-0 L18IN NONABSORBABLE BLK L19MM PS-2 PRIM 593H

## (undated) DEVICE — Device

## (undated) DEVICE — DRAPE,TOP,102X53,STERILE: Brand: MEDLINE

## (undated) DEVICE — 3M™ TEGADERM™ TRANSPARENT FILM DRESSING FRAME STYLE, 1626W, 4 IN X 4-3/4 IN (10 CM X 12 CM), 50/CT 4CT/CASE: Brand: 3M™ TEGADERM™

## (undated) DEVICE — SURGICAL PROCEDURE PACK BASIC ST FRANCIS

## (undated) DEVICE — SUT SLK 2-0SH 30IN BLK --

## (undated) DEVICE — SUTURE VCRL SZ 3-0 L27IN ABSRB UD L26MM SH 1/2 CIR J416H

## (undated) DEVICE — 3M™ TEGADERM™ TRANSPARENT FILM DRESSING FRAME STYLE, 1624W, 2-3/8 IN X 2-3/4 IN (6 CM X 7 CM), 100/CT 4CT/CASE: Brand: 3M™ TEGADERM™

## (undated) DEVICE — TELFA NON-ADHERENT ABSORBENT DRESSING: Brand: TELFA

## (undated) DEVICE — CONTAINER SPEC 4OZ GRY LID TRNSLUC GENT-L-KARE

## (undated) DEVICE — SPONGE LAP 18X18IN STRL -- 5/PK

## (undated) DEVICE — APPLIER CLP LIG SM TI PREM SURGCLP SUPER INTLOK 20 DISP

## (undated) DEVICE — KENDALL SCD EXPRESS SLEEVES, KNEE LENGTH, MEDIUM: Brand: KENDALL SCD

## (undated) DEVICE — (D)STRIP SKN CLSR 0.5X4IN WHT --

## (undated) DEVICE — (D)PREP SKN CHLRAPRP APPL 26ML -- CONVERT TO ITEM 371833

## (undated) DEVICE — Z CONVERTED USE 2421973 CONTAINER SPEC 60/30ML 10% FRMLN POLYPR PREFIL

## (undated) DEVICE — CONTAINER SPEC HISTOLOGY 900ML POLYPR

## (undated) DEVICE — GAUZE SPONGES,8 PLY: Brand: CURITY

## (undated) DEVICE — SUTURE VCRL SZ 3-0 L18IN ABSRB UD L26MM SH 1/2 CIR J864D

## (undated) DEVICE — DRSG PATCH ANTIMIC 1INX4.0MM -- CONVERT TO ITEM 356053

## (undated) DEVICE — SYR 10ML LUER LOK 1/5ML GRAD --

## (undated) DEVICE — JP PERF DRN SIL FLT 10MM FULL: Brand: CARDINAL HEALTH

## (undated) DEVICE — GOWN,REINFORCED,POLY,AURORA,XXLARGE,STR: Brand: MEDLINE

## (undated) DEVICE — DRSG AQUACEL SURG 3.5X6IN -- CONVERT TO ITEM 369227

## (undated) DEVICE — SUTURE ETHLN SZ 3-0 L18IN NONABSORBABLE BLK FS-1 L24MM 3/8 663H

## (undated) DEVICE — SUTURE ABSRB X-1 REV CUT 1/2 CIR 22MM UD BRAID 27IN SZ 3-0 J458H

## (undated) DEVICE — INTENDED FOR TISSUE SEPARATION, AND OTHER PROCEDURES THAT REQUIRE A SHARP SURGICAL BLADE TO PUNCTURE OR CUT.: Brand: BARD-PARKER SAFETY BLADES SIZE 15, STERILE

## (undated) DEVICE — INTENDED FOR TISSUE SEPARATION, AND OTHER PROCEDURES THAT REQUIRE A SHARP SURGICAL BLADE TO PUNCTURE OR CUT.: Brand: BARD-PARKER SAFETY BLADES SIZE 10, STERILE

## (undated) DEVICE — WIRE CUTTER, STERILE (WCS144): Brand: CENTURION MEDICAL PRODUCTS CORP

## (undated) DEVICE — INSULATED BLADE ELECTRODE: Brand: EDGE

## (undated) DEVICE — STERILE TETRA-FLEX CF LF, 6IN X 11 YD: Brand: TETRA-FLEX™ CF